# Patient Record
Sex: MALE | Race: WHITE | Employment: STUDENT | URBAN - NONMETROPOLITAN AREA
[De-identification: names, ages, dates, MRNs, and addresses within clinical notes are randomized per-mention and may not be internally consistent; named-entity substitution may affect disease eponyms.]

---

## 2017-06-27 ENCOUNTER — PROCEDURE VISIT (OUTPATIENT)
Dept: PRIMARY CARE CLINIC | Age: 22
End: 2017-06-27
Payer: MEDICAID

## 2017-06-27 DIAGNOSIS — Z11.1 TUBERCULOSIS SCREENING: Primary | ICD-10-CM

## 2017-06-27 PROCEDURE — 86580 TB INTRADERMAL TEST: CPT | Performed by: PEDIATRICS

## 2017-06-29 ENCOUNTER — OFFICE VISIT (OUTPATIENT)
Dept: PRIMARY CARE CLINIC | Age: 22
End: 2017-06-29
Payer: MEDICAID

## 2017-06-29 VITALS
OXYGEN SATURATION: 98 % | BODY MASS INDEX: 30.52 KG/M2 | SYSTOLIC BLOOD PRESSURE: 136 MMHG | HEART RATE: 101 BPM | DIASTOLIC BLOOD PRESSURE: 84 MMHG | WEIGHT: 218 LBS | HEIGHT: 71 IN | TEMPERATURE: 98.6 F

## 2017-06-29 DIAGNOSIS — Z00.00 WELL ADULT EXAM: Primary | ICD-10-CM

## 2017-06-29 PROCEDURE — 99395 PREV VISIT EST AGE 18-39: CPT | Performed by: NURSE PRACTITIONER

## 2017-06-29 ASSESSMENT — ENCOUNTER SYMPTOMS
EYE REDNESS: 0
DIARRHEA: 0
RHINORRHEA: 0
WHEEZING: 0
SORE THROAT: 0
COUGH: 0
EYE DISCHARGE: 0
CONSTIPATION: 0
CHOKING: 0
BLOOD IN STOOL: 0

## 2017-08-28 ENCOUNTER — OFFICE VISIT (OUTPATIENT)
Dept: PRIMARY CARE CLINIC | Age: 22
End: 2017-08-28
Payer: MEDICAID

## 2017-08-28 VITALS
HEART RATE: 76 BPM | SYSTOLIC BLOOD PRESSURE: 138 MMHG | OXYGEN SATURATION: 98 % | WEIGHT: 214.5 LBS | TEMPERATURE: 98.6 F | DIASTOLIC BLOOD PRESSURE: 76 MMHG | BODY MASS INDEX: 30.03 KG/M2 | HEIGHT: 71 IN

## 2017-08-28 DIAGNOSIS — M54.42 ACUTE LEFT-SIDED LOW BACK PAIN WITH LEFT-SIDED SCIATICA: Primary | ICD-10-CM

## 2017-08-28 PROCEDURE — 99213 OFFICE O/P EST LOW 20 MIN: CPT | Performed by: NURSE PRACTITIONER

## 2017-08-28 RX ORDER — PREDNISONE 10 MG/1
10 TABLET ORAL DAILY
Qty: 42 TABLET | Refills: 0 | Status: SHIPPED | OUTPATIENT
Start: 2017-08-28 | End: 2017-09-07

## 2017-08-28 RX ORDER — TESTOSTERONE CYPIONATE 200 MG/ML
10 INJECTION INTRAMUSCULAR ONCE
Status: COMPLETED | OUTPATIENT
Start: 2017-08-28 | End: 2017-08-28

## 2017-08-28 RX ADMIN — TESTOSTERONE CYPIONATE 10 MG: 200 INJECTION INTRAMUSCULAR at 16:51

## 2017-08-28 ASSESSMENT — ENCOUNTER SYMPTOMS
ABDOMINAL PAIN: 0
SHORTNESS OF BREATH: 0
SORE THROAT: 0
COUGH: 0
TROUBLE SWALLOWING: 0
BACK PAIN: 1
NAUSEA: 0
CONSTIPATION: 0
VOMITING: 0
RHINORRHEA: 0
DIARRHEA: 0

## 2017-09-01 ENCOUNTER — TELEPHONE (OUTPATIENT)
Dept: PRIMARY CARE CLINIC | Age: 22
End: 2017-09-01

## 2017-09-01 RX ORDER — GABAPENTIN 100 MG/1
100 CAPSULE ORAL 3 TIMES DAILY
Qty: 30 CAPSULE | Refills: 0 | OUTPATIENT
Start: 2017-09-01 | End: 2017-09-12

## 2017-09-12 ENCOUNTER — OFFICE VISIT (OUTPATIENT)
Dept: PRIMARY CARE CLINIC | Age: 22
End: 2017-09-12
Payer: MEDICAID

## 2017-09-12 VITALS
WEIGHT: 209.75 LBS | BODY MASS INDEX: 29.36 KG/M2 | HEIGHT: 71 IN | OXYGEN SATURATION: 98 % | SYSTOLIC BLOOD PRESSURE: 124 MMHG | DIASTOLIC BLOOD PRESSURE: 78 MMHG | TEMPERATURE: 96.4 F | HEART RATE: 73 BPM

## 2017-09-12 DIAGNOSIS — M62.838 MUSCLE SPASM: ICD-10-CM

## 2017-09-12 DIAGNOSIS — M54.32 SCIATICA OF LEFT SIDE: Primary | ICD-10-CM

## 2017-09-12 PROCEDURE — 99213 OFFICE O/P EST LOW 20 MIN: CPT | Performed by: PEDIATRICS

## 2017-09-12 RX ORDER — TIZANIDINE 4 MG/1
4 TABLET ORAL EVERY 6 HOURS PRN
Qty: 43 TABLET | Refills: 0 | Status: SHIPPED | OUTPATIENT
Start: 2017-09-12 | End: 2018-01-09

## 2017-09-12 RX ORDER — PREDNISONE 10 MG/1
TABLET ORAL
Qty: 43 TABLET | Refills: 0 | Status: SHIPPED | OUTPATIENT
Start: 2017-09-12 | End: 2017-09-25

## 2017-09-12 ASSESSMENT — ENCOUNTER SYMPTOMS
BACK PAIN: 0
SHORTNESS OF BREATH: 0
NAUSEA: 0
CHEST TIGHTNESS: 0
COUGH: 0
VOMITING: 0
ABDOMINAL PAIN: 0
SORE THROAT: 0
DIARRHEA: 0
WHEEZING: 0

## 2017-09-25 ENCOUNTER — OFFICE VISIT (OUTPATIENT)
Dept: PRIMARY CARE CLINIC | Age: 22
End: 2017-09-25
Payer: MEDICAID

## 2017-09-25 VITALS
OXYGEN SATURATION: 98 % | WEIGHT: 215 LBS | HEART RATE: 82 BPM | BODY MASS INDEX: 30.1 KG/M2 | DIASTOLIC BLOOD PRESSURE: 82 MMHG | TEMPERATURE: 96.7 F | HEIGHT: 71 IN | SYSTOLIC BLOOD PRESSURE: 132 MMHG

## 2017-09-25 DIAGNOSIS — G89.29 CHRONIC RIGHT-SIDED LOW BACK PAIN WITH RIGHT-SIDED SCIATICA: ICD-10-CM

## 2017-09-25 DIAGNOSIS — M54.41 CHRONIC RIGHT-SIDED LOW BACK PAIN WITH RIGHT-SIDED SCIATICA: ICD-10-CM

## 2017-09-25 DIAGNOSIS — M54.16 LUMBAR RADICULOPATHY: ICD-10-CM

## 2017-09-25 DIAGNOSIS — M54.31 SCIATICA OF RIGHT SIDE: Primary | ICD-10-CM

## 2017-09-25 PROCEDURE — 99213 OFFICE O/P EST LOW 20 MIN: CPT | Performed by: PEDIATRICS

## 2017-09-25 RX ORDER — NABUMETONE 750 MG/1
750 TABLET, FILM COATED ORAL 2 TIMES DAILY
Qty: 60 TABLET | Refills: 3 | Status: SHIPPED | OUTPATIENT
Start: 2017-09-25 | End: 2018-01-09

## 2017-09-25 ASSESSMENT — ENCOUNTER SYMPTOMS
NAUSEA: 0
WHEEZING: 0
SHORTNESS OF BREATH: 0
BACK PAIN: 1
DIARRHEA: 0
VOMITING: 0
ABDOMINAL PAIN: 0
SORE THROAT: 0
COUGH: 0
CHEST TIGHTNESS: 0

## 2017-10-02 ENCOUNTER — TELEPHONE (OUTPATIENT)
Dept: PRIMARY CARE CLINIC | Age: 22
End: 2017-10-02

## 2017-10-02 DIAGNOSIS — M54.31 RIGHT SIDED SCIATICA: ICD-10-CM

## 2017-10-02 DIAGNOSIS — M54.16 LUMBAR RADICULOPATHY: ICD-10-CM

## 2017-10-02 DIAGNOSIS — M54.41 CHRONIC RIGHT-SIDED LOW BACK PAIN WITH RIGHT-SIDED SCIATICA: Primary | ICD-10-CM

## 2017-10-02 DIAGNOSIS — G89.29 CHRONIC RIGHT-SIDED LOW BACK PAIN WITH RIGHT-SIDED SCIATICA: Primary | ICD-10-CM

## 2017-10-25 ENCOUNTER — HOSPITAL ENCOUNTER (OUTPATIENT)
Dept: GENERAL RADIOLOGY | Age: 22
Discharge: HOME | End: 2017-10-25
Payer: MEDICAID

## 2017-10-25 ENCOUNTER — TELEPHONE (OUTPATIENT)
Dept: PRIMARY CARE CLINIC | Age: 22
End: 2017-10-25

## 2017-10-25 ENCOUNTER — OFFICE VISIT (OUTPATIENT)
Dept: PRIMARY CARE CLINIC | Age: 22
End: 2017-10-25
Payer: MEDICAID

## 2017-10-25 VITALS
TEMPERATURE: 98.5 F | BODY MASS INDEX: 30.45 KG/M2 | HEART RATE: 79 BPM | HEIGHT: 71 IN | WEIGHT: 217.5 LBS | DIASTOLIC BLOOD PRESSURE: 74 MMHG | SYSTOLIC BLOOD PRESSURE: 126 MMHG | OXYGEN SATURATION: 99 %

## 2017-10-25 DIAGNOSIS — G89.29 CHRONIC LEFT-SIDED LOW BACK PAIN WITH LEFT-SIDED SCIATICA: ICD-10-CM

## 2017-10-25 DIAGNOSIS — M54.42 CHRONIC LEFT-SIDED LOW BACK PAIN WITH LEFT-SIDED SCIATICA: ICD-10-CM

## 2017-10-25 DIAGNOSIS — G89.29 CHRONIC LEFT-SIDED LOW BACK PAIN WITH LEFT-SIDED SCIATICA: Primary | ICD-10-CM

## 2017-10-25 DIAGNOSIS — M54.32 LEFT SIDED SCIATICA: ICD-10-CM

## 2017-10-25 DIAGNOSIS — M54.42 CHRONIC LEFT-SIDED LOW BACK PAIN WITH LEFT-SIDED SCIATICA: Primary | ICD-10-CM

## 2017-10-25 PROCEDURE — 99214 OFFICE O/P EST MOD 30 MIN: CPT | Performed by: PEDIATRICS

## 2017-10-25 PROCEDURE — 72100 X-RAY EXAM L-S SPINE 2/3 VWS: CPT

## 2017-10-25 PROCEDURE — G8427 DOCREV CUR MEDS BY ELIG CLIN: HCPCS | Performed by: PEDIATRICS

## 2017-10-25 PROCEDURE — 1036F TOBACCO NON-USER: CPT | Performed by: PEDIATRICS

## 2017-10-25 PROCEDURE — G8417 CALC BMI ABV UP PARAM F/U: HCPCS | Performed by: PEDIATRICS

## 2017-10-25 PROCEDURE — G8484 FLU IMMUNIZE NO ADMIN: HCPCS | Performed by: PEDIATRICS

## 2017-10-25 PROCEDURE — 72202 X-RAY EXAM SI JOINTS 3/> VWS: CPT

## 2017-10-25 ASSESSMENT — ENCOUNTER SYMPTOMS
CHEST TIGHTNESS: 0
NAUSEA: 0
DIARRHEA: 0
BLOOD IN STOOL: 0
SINUS PRESSURE: 0
EYE ITCHING: 0
EYE PAIN: 0
EYE REDNESS: 0
WHEEZING: 0
TROUBLE SWALLOWING: 0
SORE THROAT: 0
SHORTNESS OF BREATH: 0
VOMITING: 0
COUGH: 0
BACK PAIN: 1
EYE DISCHARGE: 0
ABDOMINAL PAIN: 0
CONSTIPATION: 0

## 2017-10-25 NOTE — PROGRESS NOTES
[address] [facility],  [address]  Phone [phone]   Fax [phone]      OFFICE VISIT: 10/25/2017    Karlo Human- : 1995      HPI  Reason For Visit:  Keenan Lafleur is a 25 y.o. Health Maintenance: HIV screen       The patient presents today for a 1 month follow up on back pain. Pt reports back pain that radiates down into his left leg. Hurts more in his leg now than his back. Report numbness and tingling in the leg and foot. States that sneezing, coughing, laughing all hurts his leg. Pt unable to do MRI of back due to insurance denying this. This will now have to be appealed in order to get it approved. This was scheduled at [facility]    Evaluation of his back pain began on 2017   At that time he had had problems for 2-3 months prior. He had failed ibuprofen at that time. He was treated with steroids and follow up in 2 wks    On  he was evaluated again   Prednisone was relatively ineffective but did alter the distribution of the pain. It was no longer going down to his distal lower extremity . He was having some m. Spasm and was put on tizanidine    Because he did have some appreciated benefit from prednisone, he was given another course. On  follow up, he continued to have pain in L sciatic distribution. xrays were ordered but he had them done by his chiropractor, so he did not do them. Ordered for lumbar spine and si joint on L. He had continued on nsaid therapy and continued home exercises. 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 He continues to be in severe pain. He has had some paroxysmal weakness, but this is mostly normal.  Primary problem is pain. He notes worsening of symptoms with laughing, coughing, sneezing. He also has worsening of symptoms with prolonged standing, some with sitting a lot.   Turning to look behind him is painful  Picking up something hurts and the pain is in his back and rapidly jolts down his left leg. Better:  Brief with higher dose of prednisone  Possibly when he ices his leg. Icing back does not seem to help. height is 5' 11\" (1.803 m) and weight is 217 lb 8 oz (98.7 kg). His temporal temperature is 98.5 °F (36.9 °C). His blood pressure is 126/74 and his pulse is 79. His oxygen saturation is 99%. Body mass index is 30.34 kg/m². No results found for this or any previous visit. I have reviewed the following with the Mr. Garvey   Lab Review   No visits with results within 6 Month(s) from this visit. Latest known visit with results is:   No results found for any previous visit. Copies of these are in the chart. Current Outpatient Prescriptions   Medication Sig Dispense Refill    nabumetone (RELAFEN) 750 MG tablet Take 1 tablet by mouth 2 times daily Take with food or milk 60 tablet 3    tiZANidine (ZANAFLEX) 4 MG tablet Take 1 tablet by mouth every 6 hours as needed (muscle spasm) 43 tablet 0     No current facility-administered medications for this visit. Allergies: Cinnamon; Shellfish-derived products; and Tomato    Past Medical History:   Diagnosis Date    Allergic rhinitis        History reviewed. No pertinent surgical history. Social History   Substance Use Topics    Smoking status: Never Smoker    Smokeless tobacco: Never Used    Alcohol use No       Review of Systems   Constitutional: Negative for activity change, chills, fatigue and fever. HENT: Negative for congestion, ear pain, nosebleeds, sinus pressure, sore throat and trouble swallowing. Eyes: Negative for pain, discharge, redness and itching. Respiratory: Negative for cough, chest tightness, shortness of breath and wheezing. Cardiovascular: Negative for chest pain, palpitations and leg swelling.    Gastrointestinal: Negative for abdominal pain, blood in stool, constipation, diarrhea, nausea and (MIN 4 VIEWS)    G89.29 724.3 XR SACROILIAC JOINTS (MIN 3 VIEWS)     338.29    2. Left sided sciatica M54.32 724.3 XR LUMBAR SPINE (MIN 4 VIEWS)      XR SACROILIAC JOINTS (MIN 3 VIEWS)       PLAN      ICD-10-CM ICD-9-CM    1. Chronic left-sided low back pain with left-sided sciatica M54.42 724.2 XR LUMBAR SPINE (MIN 4 VIEWS)    G89.29 724.3 XR SACROILIAC JOINTS (MIN 3 VIEWS)     338.29    2. Left sided sciatica M54.32 724.3 XR LUMBAR SPINE (MIN 4 VIEWS)      XR SACROILIAC JOINTS (MIN 3 VIEWS)       Orders Placed This Encounter   Procedures    XR LUMBAR SPINE (MIN 4 VIEWS)    XR SACROILIAC JOINTS (MIN 3 VIEWS)        Return in about 1 month (around 11/25/2017). There are no Patient Instructions on file for this visit. Additional Instructions: As always, patient is advised to bring in medication bottles in order to correctly reconcile with our current list.    Nadja Reynoso received counseling on the following healthy behaviors: continue present regimen with medications and exercises    Patient given educational materials on discussed lumbar disc disease and potential emergencies. I also explained that if xrays are normal, we will likely need mri to determine etiology    Discussed use, benefit, and side effects of prescribed medications. Barriers to medication compliance addressed. All patient questions answered. Pt voiced understanding. If you need to reach us for an appointment or any other urgent   scheduling issue,   please press the (*) sign at the beginning of the phone tree prompt after   Dialing the normal office number.     Libertad Hanson DO 20

## 2017-11-01 ENCOUNTER — TELEPHONE (OUTPATIENT)
Dept: PRIMARY CARE CLINIC | Age: 22
End: 2017-11-01

## 2017-11-27 ENCOUNTER — OFFICE VISIT (OUTPATIENT)
Dept: PRIMARY CARE CLINIC | Age: 22
End: 2017-11-27
Payer: MEDICAID

## 2017-11-27 VITALS
SYSTOLIC BLOOD PRESSURE: 112 MMHG | DIASTOLIC BLOOD PRESSURE: 72 MMHG | BODY MASS INDEX: 29.71 KG/M2 | WEIGHT: 212.25 LBS | HEIGHT: 71 IN | TEMPERATURE: 98.2 F | OXYGEN SATURATION: 98 % | HEART RATE: 64 BPM

## 2017-11-27 DIAGNOSIS — M54.16 LUMBAR RADICULOPATHY: Primary | ICD-10-CM

## 2017-11-27 PROCEDURE — 1036F TOBACCO NON-USER: CPT | Performed by: PEDIATRICS

## 2017-11-27 PROCEDURE — 99213 OFFICE O/P EST LOW 20 MIN: CPT | Performed by: PEDIATRICS

## 2017-11-27 PROCEDURE — G8484 FLU IMMUNIZE NO ADMIN: HCPCS | Performed by: PEDIATRICS

## 2017-11-27 PROCEDURE — G8417 CALC BMI ABV UP PARAM F/U: HCPCS | Performed by: PEDIATRICS

## 2017-11-27 PROCEDURE — G8427 DOCREV CUR MEDS BY ELIG CLIN: HCPCS | Performed by: PEDIATRICS

## 2017-11-27 ASSESSMENT — ENCOUNTER SYMPTOMS
SINUS PRESSURE: 0
NAUSEA: 0
BACK PAIN: 1
TROUBLE SWALLOWING: 0
COUGH: 0
VOMITING: 0
SHORTNESS OF BREATH: 0
CHEST TIGHTNESS: 0
ABDOMINAL PAIN: 0
VOICE CHANGE: 0
SORE THROAT: 0
DIARRHEA: 0
CONSTIPATION: 0
ABDOMINAL DISTENTION: 0
WHEEZING: 0
CHOKING: 0

## 2017-11-27 NOTE — PROGRESS NOTES
[address] [facility],  [address]  Phone [phone]   Fax [phone]      OFFICE VISIT: 2017    Ely Tracy- : 1995      HPI  Reason For Visit:  Priscilla Ragland is a 25 y.o. The patient presents today for a 1 month follow up on his back pain. See office note from 10/25/2017. States that he finally received a letter stating that his MRI has been approved    Pt reports his back and leg pain is the same. Has the same pain. He Can't tell that the Nabumetone is helping at all. He has been doing exercises but no benefit. He is trying to take it easy at work   Nothing has changed. height is 5' 11\" (1.803 m) and weight is 212 lb 4 oz (96.3 kg). His temporal temperature is 98.2 °F (36.8 °C). His blood pressure is 112/72 and his pulse is 64. His oxygen saturation is 98%. Body mass index is 29.6 kg/m². Lab Review   No visits with results within 6 Month(s) from this visit. Latest known visit with results is:   No results found for any previous visit. Copies of these are in the chart. Current Outpatient Prescriptions   Medication Sig Dispense Refill    nabumetone (RELAFEN) 750 MG tablet Take 1 tablet by mouth 2 times daily Take with food or milk 60 tablet 3    tiZANidine (ZANAFLEX) 4 MG tablet Take 1 tablet by mouth every 6 hours as needed (muscle spasm) 43 tablet 0     No current facility-administered medications for this visit. Allergies: Cinnamon; Shellfish-derived products; and Tomato    Past Medical History:   Diagnosis Date    Allergic rhinitis        History reviewed. No pertinent surgical history. Social History   Substance Use Topics    Smoking status: Never Smoker    Smokeless tobacco: Never Used    Alcohol use No       Review of Systems   Constitutional: Negative for activity change, appetite change, chills, diaphoresis, fatigue, fever and unexpected weight change.    HENT: Negative for congestion, ear pain, postnasal drip, sinus pressure, sneezing, sore throat, tinnitus, trouble swallowing and voice change. Eyes: Negative for visual disturbance. Respiratory: Negative for cough, choking, chest tightness, shortness of breath and wheezing. Cardiovascular: Negative for chest pain, palpitations and leg swelling. Gastrointestinal: Negative for abdominal distention, abdominal pain, constipation, diarrhea, nausea and vomiting. Genitourinary: Negative for decreased urine volume, difficulty urinating, dysuria, frequency, hematuria and urgency. Musculoskeletal: Positive for back pain. Negative for arthralgias, gait problem, joint swelling, myalgias, neck pain and neck stiffness. Skin: Negative for rash and wound. Neurological: Negative for dizziness, seizures, weakness, light-headedness, numbness and headaches. Hematological: Does not bruise/bleed easily. Psychiatric/Behavioral: Negative for agitation, confusion, decreased concentration, dysphoric mood, self-injury and sleep disturbance. The patient is not nervous/anxious and is not hyperactive. Physical Exam   Constitutional: He is oriented to person, place, and time. He appears well-developed and well-nourished. HENT:   Head: Normocephalic and atraumatic. Eyes: No scleral icterus. Neck: Normal range of motion. Neck supple. No edema present. Cardiovascular: Normal rate, regular rhythm, normal heart sounds and intact distal pulses. No murmur heard. Pulmonary/Chest: Effort normal and breath sounds normal.   Abdominal: Soft. Normal appearance and bowel sounds are normal. He exhibits no distension. There is no hepatosplenomegaly. There is no tenderness. Musculoskeletal: Normal range of motion. He exhibits tenderness (in lower leg but not at si joint or at l4-l5 regon. ). He exhibits no edema. Lumbar back: He exhibits tenderness (over left SI joint). He exhibits normal range of motion and no bony tenderness.    Straight leg raise is negative bilaterally. Neurological: He is alert and oriented to person, place, and time. Skin: Skin is warm, dry and intact. No rash noted. Psychiatric: He has a normal mood and affect. His speech is normal and behavior is normal. Judgment and thought content normal.   Vitals reviewed. ASSESSMENT      ICD-10-CM ICD-9-CM    1. Lumbar radiculopathy M54.16 724.4 MRI lumbar spine without contrast       PLAN      ICD-10-CM ICD-9-CM    1. Lumbar radiculopathy M54.16 724.4 MRI lumbar spine without contrast       Orders Placed This Encounter   Procedures    MRI lumbar spine without contrast        No Follow-up on file. There are no Patient Instructions on file for this visit. Additional Instructions: As always, patient is advised to bring in medication bottles in order to correctly reconcile with our current list.    Sharron Guzman received counseling on the following healthy behaviors: get mri done in near future    Patient given educational materials on lumbar radiculopathy    Discussed use, benefit, and side effects of prescribed medications. Barriers to medication compliance addressed. All patient questions answered. Pt voiced understanding. If you need to reach us for an appointment or any other urgent   scheduling issue,   please press the (*) sign at the beginning of the phone tree prompt after   Dialing the normal office number.     Yael Valdes,

## 2017-12-05 ENCOUNTER — TELEPHONE (OUTPATIENT)
Dept: PRIMARY CARE CLINIC | Age: 22
End: 2017-12-05

## 2017-12-05 ENCOUNTER — HOSPITAL ENCOUNTER (OUTPATIENT)
Dept: MRI IMAGING | Age: 22
Discharge: HOME | End: 2017-12-05
Payer: MEDICAID

## 2017-12-05 DIAGNOSIS — M54.16 LUMBAR RADICULOPATHY: ICD-10-CM

## 2017-12-05 DIAGNOSIS — M51.27 PROTRUSION OF INTERVERTEBRAL DISC OF LUMBOSACRAL REGION: Primary | ICD-10-CM

## 2017-12-05 PROCEDURE — 72148 MRI LUMBAR SPINE W/O DYE: CPT

## 2017-12-06 ENCOUNTER — TELEPHONE (OUTPATIENT)
Dept: NEUROSURGERY | Age: 22
End: 2017-12-06

## 2017-12-06 NOTE — TELEPHONE ENCOUNTER
Neurosurgical pre apt questionnaire     Referring physician? ELEANOR    Who is completing questionnaire? PATIENT     Has the pt had any previous spinal/brain surgeries? NO    If yes, Where was the surgery preformed? What was the surgery? Who was the surgeon? When was this surgery? Why is the pt not following up with previous surgeon? Is this a second opinion? Was a MRI preformed? YES    If not, Is there any reason a MRI cannot be performed? Is there metal anywhere in the body? If yes,  Where was the MRI preformed? [facility]   What part of the body? LUMBAR   When was it preformed? 12/5/17      Note:If  was not the facility that performed the study,    the disc will need to be brought appoint. Physical Therapy? YES - HOME THERAPY   If yes, where was PT completed? What is the duration of therapy? Pain Management? NO   If yes, where was pain mgt therapy? Is the patient still under contract with pain mgt? Who is the pain mgt physician? Is the pt currently taking anything for pain control? 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 Has the patient missed work due to pain? If unemployed, how long? Are you on disability? Symptoms?         LOW BACK PAIN, LEFT LEG PAIN IS THE WORST, PULLING AND SHOCKING PAIN

## 2018-01-09 ENCOUNTER — PREP FOR PROCEDURE (OUTPATIENT)
Dept: NEUROSURGERY | Age: 23
End: 2018-01-09

## 2018-01-09 ENCOUNTER — OFFICE VISIT (OUTPATIENT)
Dept: NEUROSURGERY | Age: 23
End: 2018-01-09
Payer: MEDICAID

## 2018-01-09 VITALS
SYSTOLIC BLOOD PRESSURE: 118 MMHG | OXYGEN SATURATION: 96 % | HEIGHT: 71 IN | DIASTOLIC BLOOD PRESSURE: 73 MMHG | BODY MASS INDEX: 30.52 KG/M2 | HEART RATE: 72 BPM | WEIGHT: 218 LBS

## 2018-01-09 DIAGNOSIS — M79.605 LEFT LEG PAIN: ICD-10-CM

## 2018-01-09 DIAGNOSIS — M54.17 LUMBOSACRAL RADICULOPATHY AT L5: ICD-10-CM

## 2018-01-09 DIAGNOSIS — R20.0 LEFT LEG NUMBNESS: ICD-10-CM

## 2018-01-09 DIAGNOSIS — M51.16 LUMBAR DISC HERNIATION WITH RADICULOPATHY: Primary | ICD-10-CM

## 2018-01-09 PROCEDURE — 99215 OFFICE O/P EST HI 40 MIN: CPT | Performed by: NURSE PRACTITIONER

## 2018-01-09 PROCEDURE — G8427 DOCREV CUR MEDS BY ELIG CLIN: HCPCS | Performed by: NURSE PRACTITIONER

## 2018-01-09 PROCEDURE — 1036F TOBACCO NON-USER: CPT | Performed by: NURSE PRACTITIONER

## 2018-01-09 PROCEDURE — G8484 FLU IMMUNIZE NO ADMIN: HCPCS | Performed by: NURSE PRACTITIONER

## 2018-01-09 PROCEDURE — G8417 CALC BMI ABV UP PARAM F/U: HCPCS | Performed by: NURSE PRACTITIONER

## 2018-01-09 RX ORDER — SODIUM CHLORIDE 0.9 % (FLUSH) 0.9 %
10 SYRINGE (ML) INJECTION EVERY 12 HOURS SCHEDULED
Status: CANCELLED | OUTPATIENT
Start: 2018-01-09

## 2018-01-09 RX ORDER — SODIUM CHLORIDE 0.9 % (FLUSH) 0.9 %
10 SYRINGE (ML) INJECTION PRN
Status: CANCELLED | OUTPATIENT
Start: 2018-01-09

## 2018-01-09 ASSESSMENT — ENCOUNTER SYMPTOMS
RESPIRATORY NEGATIVE: 1
BACK PAIN: 1
GASTROINTESTINAL NEGATIVE: 1
EYES NEGATIVE: 1

## 2018-01-09 NOTE — PROGRESS NOTES
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 The pain does radiate into the left posterolateral thigh, lateral aspect of knee, lateral aspect of shin, and bottom of foot. His pain is mostly located in the left leg. The patient complains of numbness in the left foot. His pain is not changed when going from a seated to standing position. His pain is worsened with walking. His pain is improved when lying flat. Overall, indicative that the patient does have a mechanical nature to their pain. He states that 40% of his pain is located in the back and 60% is leg pain. He states that his pain is worse with standing in one position constantly. The patient states that he can no longer get comfortable which has dramatically affected his quality of life. The patient has underwent a non-operative treatment course that has included:  NSAIDs  Muscle Relaxers   Opiates  Physical Therapy at home  Chiropractic Manipulation    Of note he does not use tobacco and does not take blood thinning medications. Past Medical History:   Diagnosis Date    Allergic rhinitis        History reviewed. No pertinent surgical history. No current outpatient prescriptions on file. No current facility-administered medications for this visit. Allergies:  Cinnamon; Shellfish-derived products; and Tomato    Social History:   History   Smoking Status    Never Smoker   Smokeless Tobacco    Never Used     History   Alcohol Use No         Family History:   History reviewed. No pertinent family history.     REVIEW OF SYSTEMS:  Review of Systems LABALBU, BILITOT, ALKPHOS, AST, ALT, LABGLOM, GFRAA, AGRATIO, GLOBNo results found for: INR, PROTIME    Narrative   MRI LUMBAR SPINE WO CONTRAST 12/5/2017 10:38 AM   HISTORY: M54.16   COMPARISON: 10/25/2017 lumbar radiographs   TECHNIQUE:  Multiplanar MR imaging of the lumbar spine was performed. FINDINGS:    Normal marrow signal is identified in the lumbar spine. Particularly   there is no bone marrow edema or evidence of acute bony abnormality. There is no signal abnormalities identified in the distal cord or   conus medullaris region. 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 Bev Mckeon DISC SPACE LEVELS:   L5-S1: There is mild disc degeneration. Disc space narrowing   suspected. There is mild desiccation disc material loss of normal T2   signal. There is a broad-based right paracentral disc protrusion with   disc material extending into the right lateral recess, abutting the   right S1 nerve root without significant mass effect on the neural   structures otherwise. Correlate for right S1 radiculopathy. Disc   material does extend into the right foramina. There is no significant   canal stenosis. There is no significant foraminal narrowing. L4-L5: There is disc degeneration with desiccation of disc material   and mild loss of normal T2 signal. Mild thinning of the disc space   question. There is broad-based central to left paracentral disc   protrusion versus mild extrusion. There is significant associated mass   effect on the thecal sac with central left paracentral deformity and   relative canal stenosis. Mass effect on the left L5 nerve root   observed. Correlate for left L5 radiculopathy. The disc material   extending into the left foramina. No significant foraminal stenosis   identified. The remaining disc spaces are maintained without disc significant disc   bulges focal protrusions or herniations.  There is no significant canal

## 2018-01-10 NOTE — H&P
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 The pain does radiate into the left posterolateral thigh, lateral aspect of knee, lateral aspect of shin, and bottom of foot. His pain is mostly located in the left leg. The patient complains of numbness in the left foot. His pain is not changed when going from a seated to standing position. His pain is worsened with walking. His pain is improved when lying flat. Overall, indicative that the patient does have a mechanical nature to their pain. He states that 40% of his pain is located in the back and 60% is leg pain. He states that his pain is worse with standing in one position constantly. The patient states that he can no longer get comfortable which has dramatically affected his quality of life. The patient has underwent a non-operative treatment course that has included:  NSAIDs  Muscle Relaxers   Opiates  Physical Therapy at home  Chiropractic Manipulation    Of note he does not use tobacco and does not take blood thinning medications. Past Medical History:   Diagnosis Date    Allergic rhinitis        History reviewed. No pertinent surgical history. No current outpatient prescriptions on file. No current facility-administered medications for this visit. Allergies:  Cinnamon; Shellfish-derived products; and Tomato    Social History:   History   Smoking Status    Never Smoker   Smokeless Tobacco    Never Used     History   Alcohol Use No         Family History:   History reviewed. No pertinent family history.     REVIEW OF SYSTEMS:  Review of Systems   Constitutional: Positive for malaise/fatigue. Negative for chills, diaphoresis, fever and weight loss. HENT: Negative. Eyes: Negative. Respiratory: Negative. Cardiovascular: Negative. Gastrointestinal: Negative. Genitourinary: Negative. Musculoskeletal: Positive for back pain, joint pain and myalgias. Negative for falls and neck pain. Skin: Negative. Neurological: Positive for tingling and weakness. Negative for dizziness, tremors, sensory change, speech change, focal weakness, seizures, loss of consciousness and headaches. Endo/Heme/Allergies: Negative. Psychiatric/Behavioral: Negative. PHYSICAL EXAM:  Vitals:    01/09/18 1143   BP: 118/73   Pulse: 72   SpO2: 96%     Constitutional: appears well-developed and well-nourished. Eyes - conjunctiva normal.  Pupils react to light  Ear, nose, throat -hearing intact to finger rub, No scars, masses, or lesions over external nose or ears, no atrophy of tongue  Neck-symmetric, no masses noted, no jugular vein distension  Respiration- chest wall appears symmetric, good expansion, normal effort without use of accessory muscles  Musculoskeletal - no significant wasting of muscles noted, no bony deformities, gait no gross ataxia  Extremities-no clubbing, cyanosis or edema  Skin - warm, dry, and intact. No rash, erythema, or pallor. Psychiatric - mood, affect, and behavior appear normal.     Neurologic Examination  Awake, Alert and oriented x 3  Normal speech pattern, following commands  Motor 5/5 all extremities  No deficits to light touch or pinprick sensation  Reflexes are 2+ and symmetric  No myofacial tenderness to palpation  Normal Gait pattern    Straight leg raise was positive on the left    DATA and IMAGING:    Nursing/pcp notes, imaging, labs, and vitals reviewed.      PT,OT and/or speech notes reviewed    No results found for: WBC, HGB, HCT, MCV, PLTNo results found for: NA, K, CL, CO2, BUN, CREATININE, GLUCOSE, CALCIUM, PROT, LABALBU, BILITOT, Impression   1. Broad-based central left paracentral disc protrusion/extrusion at   L4-L5 with associated mass effect on the neural structures, described   above. 2. Right paracentral disc protrusion L5-S1. Signed by Dr David Rodrigues on 12/5/2017 12:41 PM     I have personally reviewed these images and my interpretation is:  L4-5 there is a left posterolateral disc herniation that results in compression of the left L5 nerve root  L5-S1 there is a right posterolateral disc herniation that results in compression of the right S1 nerve root       ASSESSMENT:    Martha Aaron is a 25 y.o. male with complaints of left leg pain that has been present for about 6 months. He describes a L5 radicular pattern. ICD-10-CM ICD-9-CM    1. Lumbar disc herniation with radiculopathy M51.16 722.10    2. Lumbosacral radiculopathy at L5 M54.17 724.4    3. Left leg pain M79.605 729.5    4. Left leg numbness R20.0 782.0        PLAN:  -We discussed the results/findings of the MRI lumbar spine with Mr. Sindy Myers and his wife at length. We explained that he does have a herniated disc that correlates well with his described symptoms. We also explained that this is a benign process and that his symptoms will be amenable to surgery, however, we are giving him the option whether or not to proceed with surgery or continue non-operative treatments.    -He and his wife have decided to move forward with surgery. He will need a Left L4-5 microdiscectomy using minimally invasive technique     We discussed risks, complications, and expectations, including but not limited to infection, paralysis, bowel and bladder dysfunction, possible need for revision procedure, persistent pain, spinal fluid leak, stroke and death. In addition, the benefits of the surgery were thoroughly discussed and the patient demonstrated a deep understanding. The patient wishes to proceed with surgical intervention.   We will schedule for surgery in the near

## 2018-01-15 ENCOUNTER — TELEPHONE (OUTPATIENT)
Dept: NEUROSURGERY | Age: 23
End: 2018-01-15

## 2018-02-08 ENCOUNTER — TELEPHONE (OUTPATIENT)
Dept: NEUROSURGERY | Age: 23
End: 2018-02-08

## 2018-03-06 ENCOUNTER — OFFICE VISIT (OUTPATIENT)
Dept: PRIMARY CARE CLINIC | Age: 23
End: 2018-03-06
Payer: MEDICAID

## 2018-03-06 VITALS
BODY MASS INDEX: 28.84 KG/M2 | DIASTOLIC BLOOD PRESSURE: 84 MMHG | TEMPERATURE: 98.3 F | SYSTOLIC BLOOD PRESSURE: 136 MMHG | HEIGHT: 71 IN | WEIGHT: 206 LBS | OXYGEN SATURATION: 99 % | HEART RATE: 67 BPM

## 2018-03-06 DIAGNOSIS — R39.89 URINE DISCOLORATION: Primary | ICD-10-CM

## 2018-03-06 DIAGNOSIS — R39.89 URINE DISCOLORATION: ICD-10-CM

## 2018-03-06 LAB
APPEARANCE FLUID: NORMAL
BILIRUBIN, POC: NORMAL
BLOOD URINE, POC: NORMAL
CLARITY, POC: CLEAR
COLOR, POC: YELLOW
GLUCOSE URINE, POC: NORMAL
KETONES, POC: NORMAL
LEUKOCYTE EST, POC: NORMAL
NITRITE, POC: NORMAL
PH, POC: 6
PROTEIN, POC: NORMAL
SPECIFIC GRAVITY, POC: 1.02
UROBILINOGEN, POC: 0.2

## 2018-03-06 PROCEDURE — 1036F TOBACCO NON-USER: CPT | Performed by: NURSE PRACTITIONER

## 2018-03-06 PROCEDURE — G8427 DOCREV CUR MEDS BY ELIG CLIN: HCPCS | Performed by: NURSE PRACTITIONER

## 2018-03-06 PROCEDURE — 99213 OFFICE O/P EST LOW 20 MIN: CPT | Performed by: NURSE PRACTITIONER

## 2018-03-06 PROCEDURE — G8484 FLU IMMUNIZE NO ADMIN: HCPCS | Performed by: NURSE PRACTITIONER

## 2018-03-06 PROCEDURE — G8417 CALC BMI ABV UP PARAM F/U: HCPCS | Performed by: NURSE PRACTITIONER

## 2018-03-06 ASSESSMENT — ENCOUNTER SYMPTOMS
WHEEZING: 0
COUGH: 0
EYE DISCHARGE: 0
CONSTIPATION: 0
SORE THROAT: 0
DIARRHEA: 0
EYE REDNESS: 0
CHOKING: 0
BLOOD IN STOOL: 0
RHINORRHEA: 0

## 2018-03-06 NOTE — PROGRESS NOTES
Sheryl [address]  [place], [address] [place] [address] Rd  Phone [phone]   Fax [phone]      OFFICE VISIT: 3/6/2018    Lisa Schmidt is a 25 y.o. male who presents today for his medical conditions/complaints as noted below. Lisa Schmidt is c/o of Other (first urine yesterday was white. has been normal since then. )        HPI:     HPI  Other (first urine yesterday was white. has been normal since then. )  No other symptoms  Denies dysuria, discharge, testicle pain and swelling. Normal urine since. Past Medical History:   Diagnosis Date    Allergic rhinitis       No past surgical history on file. No family history on file. Social History   Substance Use Topics    Smoking status: Never Smoker    Smokeless tobacco: Never Used    Alcohol use No      No current outpatient prescriptions on file. No current facility-administered medications for this visit. Allergies   Allergen Reactions    Cinnamon     Shellfish-Derived Products     Tomato        Health Maintenance   Topic Date Due    HIV screen  06/26/2011    Meningococcal (MCV) Vaccine Age 0-22 Years (1 of 1) 06/26/2012    DTaP/Tdap/Td vaccine (1 - Tdap) 06/26/2015    Flu vaccine (1) 09/01/2017        Subjective:      Review of Systems   Constitutional: Negative for appetite change and unexpected weight change. HENT: Negative for congestion, ear pain, rhinorrhea and sore throat. Eyes: Negative for discharge and redness. Respiratory: Negative for cough, choking and wheezing. Cardiovascular: Negative for chest pain. Gastrointestinal: Negative for blood in stool, constipation and diarrhea. Genitourinary: Negative for decreased urine volume, difficulty urinating, discharge, flank pain, frequency, genital sores, hematuria, penile pain, penile swelling, scrotal swelling, testicular pain and urgency. Skin: Negative for rash. Neurological: Negative for weakness. Hematological: Negative for adenopathy.    Psychiatric/Behavioral: Negative for suicidal ideas. Objective:     Physical Exam   Constitutional: He is oriented to person, place, and time. He appears well-developed and well-nourished. HENT:   Head: Normocephalic. Eyes: Conjunctivae are normal.   Neck: Normal range of motion. Neck supple. Cardiovascular: Normal rate and regular rhythm. Pulmonary/Chest: Effort normal.   Musculoskeletal: He exhibits no edema. Neurological: He is alert and oriented to person, place, and time. Skin: Skin is warm and dry. Psychiatric: His behavior is normal.   Vitals reviewed. /84   Pulse 67   Temp 98.3 °F (36.8 °C) (Temporal)   Ht 5' 11\" (1.803 m)   Wt 206 lb (93.4 kg)   SpO2 99%   BMI 28.73 kg/m²     Assessment:        ICD-10-CM ICD-9-CM    1. Urine discoloration R39.89 791.9 POCT Urinalysis no Micro      Urine Culture      C. Trachomatis / N. Gonorrhoeae, DNA       Plan:    will check culture  If further symptoms will refer to urology. Return if symptoms worsen or fail to improve. Orders Placed This Encounter   Procedures    Urine Culture     Standing Status:   Future     Standing Expiration Date:   3/6/2019     Order Specific Question:   Specify (ex-cath, midstream, cysto, etc)? Answer:   midstream    C. Trachomatis / N. Gonorrhoeae, DNA    POCT Urinalysis no Micro     No orders of the defined types were placed in this encounter. Discussed use, benefit, and side effects of prescribed medications. All patient questions answered. Pt voiced understanding. Reviewed health maintenance. .  Patient agreed with treatment plan. Follow up as directed. There are no Patient Instructions on file for this visit.       Electronically signed by ARTURO Rodriguez on 3/6/2018 at 4:16 PM

## 2018-03-08 LAB — URINE CULTURE, ROUTINE: NORMAL

## 2018-03-09 ENCOUNTER — TELEPHONE (OUTPATIENT)
Dept: PRIMARY CARE CLINIC | Age: 23
End: 2018-03-09

## 2018-03-09 LAB
APTIMA MEDIA TYPE: NORMAL
CHLAMYDIA TRACHOMATIS AMPLIFIED DET: NEGATIVE
N GONORRHOEAE AMPLIFIED DET: NEGATIVE
SPECIMEN SOURCE: NORMAL

## 2018-03-12 ENCOUNTER — TELEPHONE (OUTPATIENT)
Dept: PRIMARY CARE CLINIC | Age: 23
End: 2018-03-12

## 2018-03-12 NOTE — TELEPHONE ENCOUNTER
----- Message from ARTURO Cox sent at 3/12/2018  8:27 AM CDT -----  Please inform patient results are normal urine testing

## 2018-03-16 ENCOUNTER — OFFICE VISIT (OUTPATIENT)
Dept: PRIMARY CARE CLINIC | Age: 23
End: 2018-03-16
Payer: MEDICAID

## 2018-03-16 VITALS
HEART RATE: 66 BPM | WEIGHT: 204 LBS | DIASTOLIC BLOOD PRESSURE: 70 MMHG | OXYGEN SATURATION: 99 % | BODY MASS INDEX: 28.56 KG/M2 | HEIGHT: 71 IN | TEMPERATURE: 98.7 F | SYSTOLIC BLOOD PRESSURE: 110 MMHG

## 2018-03-16 DIAGNOSIS — R50.9 FEVER, UNSPECIFIED FEVER CAUSE: ICD-10-CM

## 2018-03-16 DIAGNOSIS — J02.0 PHARYNGITIS DUE TO STREPTOCOCCUS SPECIES: Primary | ICD-10-CM

## 2018-03-16 DIAGNOSIS — J02.0 STREP PHARYNGITIS: ICD-10-CM

## 2018-03-16 LAB — S PYO AG THROAT QL: POSITIVE

## 2018-03-16 PROCEDURE — 1036F TOBACCO NON-USER: CPT | Performed by: NURSE PRACTITIONER

## 2018-03-16 PROCEDURE — 99213 OFFICE O/P EST LOW 20 MIN: CPT | Performed by: NURSE PRACTITIONER

## 2018-03-16 PROCEDURE — G8417 CALC BMI ABV UP PARAM F/U: HCPCS | Performed by: NURSE PRACTITIONER

## 2018-03-16 PROCEDURE — 87880 STREP A ASSAY W/OPTIC: CPT | Performed by: NURSE PRACTITIONER

## 2018-03-16 PROCEDURE — G8427 DOCREV CUR MEDS BY ELIG CLIN: HCPCS | Performed by: NURSE PRACTITIONER

## 2018-03-16 PROCEDURE — G8484 FLU IMMUNIZE NO ADMIN: HCPCS | Performed by: NURSE PRACTITIONER

## 2018-03-16 RX ORDER — AMOXICILLIN 875 MG/1
875 TABLET, COATED ORAL 2 TIMES DAILY
Qty: 20 TABLET | Refills: 0 | Status: SHIPPED | OUTPATIENT
Start: 2018-03-16 | End: 2018-03-26

## 2018-03-16 ASSESSMENT — ENCOUNTER SYMPTOMS
RHINORRHEA: 0
SORE THROAT: 1
NAUSEA: 0
CONSTIPATION: 0
EYE REDNESS: 0
DIARRHEA: 0
EYE DISCHARGE: 0
SWOLLEN GLANDS: 1
BLOOD IN STOOL: 0
CHOKING: 0
COUGH: 0
WHEEZING: 0

## 2018-03-16 NOTE — PROGRESS NOTES
rash.   Neurological: Positive for headaches. Negative for weakness. Hematological: Negative for adenopathy. Psychiatric/Behavioral: Negative for suicidal ideas. Objective:     Physical Exam   Constitutional: He is oriented to person, place, and time. He appears well-developed and well-nourished. HENT:   Head: Normocephalic. Mouth/Throat: Oropharyngeal exudate, posterior oropharyngeal edema and posterior oropharyngeal erythema present. Eyes: Conjunctivae are normal.   Neck: Normal range of motion. Neck supple. Cardiovascular: Normal rate, regular rhythm and normal heart sounds. Pulmonary/Chest: Effort normal and breath sounds normal.   Abdominal: Soft. Bowel sounds are normal. There is no tenderness. Musculoskeletal: He exhibits no edema. Neurological: He is alert and oriented to person, place, and time. Skin: Skin is warm and dry. Psychiatric: His behavior is normal.   Vitals reviewed. /70 (Site: Left Arm, Position: Sitting, Cuff Size: Large Adult)   Pulse 66   Temp 98.7 °F (37.1 °C) (Temporal)   Ht 5' 11\" (1.803 m)   Wt 204 lb (92.5 kg)   SpO2 99%   BMI 28.45 kg/m²     Assessment:        ICD-10-CM ICD-9-CM    1. Pharyngitis due to Streptococcus species J02.0 034.0 POCT rapid strep A   2. Fever, unspecified fever cause R50.9 780.60    3. Strep pharyngitis J02.0 034.0 amoxicillin (AMOXIL) 875 MG tablet       Plan:    strep pos    Return if symptoms worsen or fail to improve. Orders Placed This Encounter   Procedures    POCT rapid strep A     Orders Placed This Encounter   Medications    amoxicillin (AMOXIL) 875 MG tablet     Sig: Take 1 tablet by mouth 2 times daily for 10 days     Dispense:  20 tablet     Refill:  0         Discussed use, benefit, and side effects of prescribed medications. All patient questions answered. Pt voiced understanding. Reviewed health maintenance. .  Patient agreed with treatment plan. Follow up as directed.             There are no

## 2018-03-19 ENCOUNTER — TELEPHONE (OUTPATIENT)
Dept: PRIMARY CARE CLINIC | Age: 23
End: 2018-03-19

## 2018-03-20 ENCOUNTER — OFFICE VISIT (OUTPATIENT)
Dept: PRIMARY CARE CLINIC | Age: 23
End: 2018-03-20
Payer: MEDICAID

## 2018-03-20 VITALS
BODY MASS INDEX: 28.7 KG/M2 | HEIGHT: 71 IN | SYSTOLIC BLOOD PRESSURE: 106 MMHG | DIASTOLIC BLOOD PRESSURE: 74 MMHG | HEART RATE: 69 BPM | WEIGHT: 205 LBS | OXYGEN SATURATION: 99 % | TEMPERATURE: 97.9 F

## 2018-03-20 DIAGNOSIS — R10.31 RIGHT INGUINAL PAIN: Primary | ICD-10-CM

## 2018-03-20 PROCEDURE — G8484 FLU IMMUNIZE NO ADMIN: HCPCS | Performed by: PEDIATRICS

## 2018-03-20 PROCEDURE — G8427 DOCREV CUR MEDS BY ELIG CLIN: HCPCS | Performed by: PEDIATRICS

## 2018-03-20 PROCEDURE — G8417 CALC BMI ABV UP PARAM F/U: HCPCS | Performed by: PEDIATRICS

## 2018-03-20 PROCEDURE — 1036F TOBACCO NON-USER: CPT | Performed by: PEDIATRICS

## 2018-03-20 PROCEDURE — 99213 OFFICE O/P EST LOW 20 MIN: CPT | Performed by: PEDIATRICS

## 2018-03-20 RX ORDER — CEPHALEXIN 500 MG/1
500 CAPSULE ORAL 3 TIMES DAILY
Qty: 30 CAPSULE | Refills: 0 | Status: SHIPPED | OUTPATIENT
Start: 2018-03-20 | End: 2018-07-19

## 2018-03-20 ASSESSMENT — ENCOUNTER SYMPTOMS
WHEEZING: 0
DIARRHEA: 0
ABDOMINAL DISTENTION: 0
ABDOMINAL PAIN: 0
SHORTNESS OF BREATH: 0
NAUSEA: 0
SINUS PRESSURE: 0
CHEST TIGHTNESS: 0
VOICE CHANGE: 0
CONSTIPATION: 0
COUGH: 0
CHOKING: 0
BACK PAIN: 1
VOMITING: 0
SORE THROAT: 0
TROUBLE SWALLOWING: 0

## 2018-03-20 NOTE — PROGRESS NOTES
bruise/bleed easily. Psychiatric/Behavioral: Negative for agitation, confusion, decreased concentration, dysphoric mood, self-injury and sleep disturbance. The patient is not nervous/anxious and is not hyperactive. Physical Exam   Constitutional: He is oriented to person, place, and time. He appears well-developed and well-nourished. No distress. HENT:   Head: Normocephalic and atraumatic. Right Ear: External ear normal.   Left Ear: External ear normal.   Nose: Nose normal.   Mouth/Throat: Oropharynx is clear and moist.   Eyes: Conjunctivae and EOM are normal. Pupils are equal, round, and reactive to light. No scleral icterus. Neck: Normal range of motion. Neck supple. No JVD present. Carotid bruit is not present. No thyromegaly present. Cardiovascular: Normal rate, regular rhythm, S1 normal, S2 normal and normal heart sounds. No extrasystoles are present. PMI is not displaced. Exam reveals no gallop and no friction rub. No murmur heard. Pulmonary/Chest: Effort normal and breath sounds normal. No respiratory distress. He has no wheezes. He has no rhonchi. He has no rales. Abdominal: Soft. Bowel sounds are normal. There is no hepatosplenomegaly. There is no tenderness. There is no rebound, no guarding and no CVA tenderness. Genitourinary: Penis normal. No penile tenderness. Genitourinary Comments: Testicular exam is unremarkable without any pain over the epididymis or testicle. No masses present. No evidence of any inguinal hernias. There are no cutaneous changes noted   Musculoskeletal: Normal range of motion. He exhibits no edema or tenderness. Lymphadenopathy:     He has no cervical adenopathy. Neurological: He is alert and oriented to person, place, and time. He has normal strength. No sensory deficit (no numbness or tingling). Skin: Skin is warm and dry. No rash noted. Psychiatric: He has a normal mood and affect. ASSESSMENT      ICD-10-CM ICD-9-CM    1.  Right inguinal pain R10.31 789.09        PLAN      ICD-10-CM ICD-9-CM    1. Right inguinal pain R10.31 789.09 I see no gross abnormality on exam.  In laboratory review he did have some microscopic hematuria when his symptoms began. This may be representing renal lithiasis and a traveling stone. Symptoms have predominantly improved and I reluctant to order a CT scan at this time given that he may have already passed the stone. I recommend patient for him at this point is to increase his fluid consumption to facilitate passage of a stone. His symptoms do not improve he will call me back within the next few days. No orders of the defined types were placed in this encounter. Return if symptoms worsen or fail to improve. There are no Patient Instructions on file for this visit.

## 2018-07-19 ENCOUNTER — OFFICE VISIT (OUTPATIENT)
Dept: PRIMARY CARE CLINIC | Age: 23
End: 2018-07-19
Payer: MEDICAID

## 2018-07-19 VITALS
DIASTOLIC BLOOD PRESSURE: 74 MMHG | SYSTOLIC BLOOD PRESSURE: 124 MMHG | TEMPERATURE: 99 F | WEIGHT: 198.8 LBS | HEIGHT: 71 IN | BODY MASS INDEX: 27.83 KG/M2 | HEART RATE: 78 BPM | OXYGEN SATURATION: 98 %

## 2018-07-19 DIAGNOSIS — K92.1 HEMATOCHEZIA: ICD-10-CM

## 2018-07-19 DIAGNOSIS — R19.7 DIARRHEA OF PRESUMED INFECTIOUS ORIGIN: ICD-10-CM

## 2018-07-19 LAB
C DIFFICILE TOXIN, EIA: NORMAL
CRYPTOSPORIDIUM ANTIGEN STOOL: NORMAL
GIARDIA ANTIGEN STOOL: NORMAL
LACTOFERRIN, FECAL: NEGATIVE

## 2018-07-19 PROCEDURE — 1036F TOBACCO NON-USER: CPT | Performed by: PEDIATRICS

## 2018-07-19 PROCEDURE — G8427 DOCREV CUR MEDS BY ELIG CLIN: HCPCS | Performed by: PEDIATRICS

## 2018-07-19 PROCEDURE — G8417 CALC BMI ABV UP PARAM F/U: HCPCS | Performed by: PEDIATRICS

## 2018-07-19 PROCEDURE — 99213 OFFICE O/P EST LOW 20 MIN: CPT | Performed by: PEDIATRICS

## 2018-07-19 RX ORDER — LACTOBACILLUS RHAMNOSUS GG 10B CELL
1 CAPSULE ORAL DAILY
COMMUNITY
End: 2019-11-21

## 2018-07-19 ASSESSMENT — ENCOUNTER SYMPTOMS
NAUSEA: 0
CHEST TIGHTNESS: 0
VOMITING: 0
ABDOMINAL PAIN: 1
DIARRHEA: 1
SHORTNESS OF BREATH: 0
CONSTIPATION: 0
CHOKING: 0
SORE THROAT: 0
SINUS PRESSURE: 0
TROUBLE SWALLOWING: 0
WHEEZING: 0
BACK PAIN: 0
ABDOMINAL DISTENTION: 0
COUGH: 0
VOICE CHANGE: 0

## 2018-07-19 NOTE — PROGRESS NOTES
[address] [facility], [address]  Phone [phone]   Fax [phone]      OFFICE VISIT: 2018    Guadalupe Lo- : 1995      HPI  Reason For Visit:  Angela Andre is a 26 y.o. Health Maintenance    Abdominal Pain (Patient reports abd pain x's 4 days) and Diarrhea (Patient states he has had diarrhea x's 4 days as well )    Patient presents with abdominal pain and diarrhea  Is been going on for the past 4 days. No other fever or chills  No real cramping  No uri symptosm  He notes a faint amount of blood when he wipes. No unusual foods. Ill contacts: none that he knows of    Treatment:  Pepto Bismol  Keiffer treatment   Increased fiber     height is 5' 11\" (1.803 m) and weight is 198 lb 12.8 oz (90.2 kg). His temporal temperature is 99 °F (37.2 °C). His blood pressure is 124/74 and his pulse is 78. His oxygen saturation is 98%. Body mass index is 27.73 kg/m². I have reviewed the following with the Mr. Garvey   Lab Review   Office Visit on 2018   Component Date Value    Strep A Ag 2018 Positive*   Orders Only on 2018   Component Date Value    Urine Culture, Routine 2018 No growth at 36-48 hours     C. Trachomatis Amplified 2018 Negative     N. Gonorrhoeae Amplified 2018 Negative     APTIMA Media Type 2018 Urine     Specimen Source 2018 Urine    Office Visit on 2018   Component Date Value    Color, UA 2018 yellow     Clarity, UA 2018 clear     Glucose, UA POC 2018 neg     Bilirubin, UA 2018 neg     Ketones, UA 2018 neg     Spec Grav, UA 2018 1.020     Blood, UA POC 2018 trace-intact     pH, UA 2018 6.0     Protein, UA POC 2018 neg     Urobilinogen, UA 2018 0.2     Leukocytes, UA 2018 neg     Nitrite, UA 2018 neg      Copies of these are in the chart.     Current Outpatient Prescriptions   Medication Sig Dispense Refill    self-injury and sleep disturbance. The patient is not nervous/anxious and is not hyperactive. Physical Exam   Constitutional: He is oriented to person, place, and time. He appears well-developed and well-nourished. No distress. HENT:   Head: Normocephalic and atraumatic. Right Ear: External ear normal.   Left Ear: External ear normal.   Nose: Nose normal.   Mouth/Throat: Oropharynx is clear and moist.   Eyes: Conjunctivae and EOM are normal. Pupils are equal, round, and reactive to light. No scleral icterus. Neck: Normal range of motion. Neck supple. No JVD present. Carotid bruit is not present. No thyromegaly present. Cardiovascular: Normal rate, regular rhythm, S1 normal, S2 normal and normal heart sounds. No extrasystoles are present. PMI is not displaced. Exam reveals no gallop and no friction rub. No murmur heard. Pulmonary/Chest: Effort normal and breath sounds normal. No respiratory distress. He has no wheezes. He has no rhonchi. He has no rales. Abdominal: Soft. Bowel sounds are normal. He exhibits no distension. There is no hepatosplenomegaly. There is no tenderness. There is no rebound, no guarding and no CVA tenderness. Genitourinary: Penis normal. No penile tenderness. Musculoskeletal: Normal range of motion. He exhibits no edema or tenderness. Lymphadenopathy:     He has no cervical adenopathy. Neurological: He is alert and oriented to person, place, and time. He has normal strength. No sensory deficit (no numbness or tingling). Skin: Skin is warm and dry. No rash noted. Psychiatric: He has a normal mood and affect. ASSESSMENT      ICD-10-CM ICD-9-CM    1. Hematochezia K92.1 578.1 O&P PANEL ([facility]) #1      Clostridium Difficile Toxin/Antigen      Fecal leukocytes      Culture Stool      O&P SCREEN(GIARDIA/CRYPTOSPORIDIUM) #1   2.  Diarrhea of presumed infectious origin A09 009.3 O&P PANEL ([facility] [employer]) #1      Clostridium Difficile

## 2018-07-22 LAB
CULTURE, STOOL: NORMAL
E COLI SHIGA TOXIN ASSAY: NORMAL

## 2018-07-23 ENCOUNTER — TELEPHONE (OUTPATIENT)
Dept: PRIMARY CARE CLINIC | Age: 23
End: 2018-07-23

## 2019-11-21 ENCOUNTER — OFFICE VISIT (OUTPATIENT)
Dept: PRIMARY CARE CLINIC | Age: 24
End: 2019-11-21
Payer: COMMERCIAL

## 2019-11-21 VITALS
DIASTOLIC BLOOD PRESSURE: 70 MMHG | TEMPERATURE: 98.3 F | OXYGEN SATURATION: 98 % | SYSTOLIC BLOOD PRESSURE: 134 MMHG | BODY MASS INDEX: 25.05 KG/M2 | HEIGHT: 70 IN | HEART RATE: 64 BPM | WEIGHT: 175 LBS

## 2019-11-21 DIAGNOSIS — H65.111 ACUTE MUCOID OTITIS MEDIA OF RIGHT EAR: Primary | ICD-10-CM

## 2019-11-21 PROCEDURE — 99213 OFFICE O/P EST LOW 20 MIN: CPT | Performed by: NURSE PRACTITIONER

## 2019-11-21 RX ORDER — CEFDINIR 300 MG/1
300 CAPSULE ORAL 2 TIMES DAILY
Qty: 20 CAPSULE | Refills: 0 | Status: SHIPPED | OUTPATIENT
Start: 2019-11-21 | End: 2019-12-01

## 2019-11-21 RX ORDER — FLUTICASONE PROPIONATE 50 MCG
SPRAY, SUSPENSION (ML) NASAL
Qty: 1 BOTTLE | Refills: 11 | Status: SHIPPED | OUTPATIENT
Start: 2019-11-21 | End: 2021-06-18

## 2019-11-21 ASSESSMENT — ENCOUNTER SYMPTOMS
BLOOD IN STOOL: 0
WHEEZING: 0
DIARRHEA: 0
EYE DISCHARGE: 0
CONSTIPATION: 0
EYE REDNESS: 0
SORE THROAT: 1
COUGH: 0
RHINORRHEA: 1
CHOKING: 0

## 2019-11-21 ASSESSMENT — PATIENT HEALTH QUESTIONNAIRE - PHQ9
SUM OF ALL RESPONSES TO PHQ9 QUESTIONS 1 & 2: 0
1. LITTLE INTEREST OR PLEASURE IN DOING THINGS: 0
2. FEELING DOWN, DEPRESSED OR HOPELESS: 0
SUM OF ALL RESPONSES TO PHQ QUESTIONS 1-9: 0
SUM OF ALL RESPONSES TO PHQ QUESTIONS 1-9: 0

## 2020-02-24 ENCOUNTER — OFFICE VISIT (OUTPATIENT)
Dept: PRIMARY CARE CLINIC | Age: 25
End: 2020-02-24
Payer: COMMERCIAL

## 2020-02-24 VITALS
TEMPERATURE: 102.4 F | OXYGEN SATURATION: 98 % | HEIGHT: 71 IN | HEART RATE: 111 BPM | WEIGHT: 178.25 LBS | SYSTOLIC BLOOD PRESSURE: 128 MMHG | DIASTOLIC BLOOD PRESSURE: 74 MMHG | BODY MASS INDEX: 24.95 KG/M2

## 2020-02-24 LAB
INFLUENZA A ANTIBODY: NEGATIVE
INFLUENZA B ANTIBODY: NORMAL
S PYO AG THROAT QL: NORMAL

## 2020-02-24 PROCEDURE — 87880 STREP A ASSAY W/OPTIC: CPT | Performed by: NURSE PRACTITIONER

## 2020-02-24 PROCEDURE — 99214 OFFICE O/P EST MOD 30 MIN: CPT | Performed by: NURSE PRACTITIONER

## 2020-02-24 PROCEDURE — 87804 INFLUENZA ASSAY W/OPTIC: CPT | Performed by: NURSE PRACTITIONER

## 2020-02-24 RX ORDER — ONDANSETRON 4 MG/1
4 TABLET, ORALLY DISINTEGRATING ORAL 3 TIMES DAILY PRN
Qty: 21 TABLET | Refills: 0 | Status: SHIPPED | OUTPATIENT
Start: 2020-02-24 | End: 2021-06-18

## 2020-02-24 RX ORDER — OSELTAMIVIR PHOSPHATE 75 MG/1
75 CAPSULE ORAL 2 TIMES DAILY
Qty: 10 CAPSULE | Refills: 0 | Status: SHIPPED | OUTPATIENT
Start: 2020-02-24 | End: 2020-02-29

## 2020-02-24 ASSESSMENT — ENCOUNTER SYMPTOMS
DIARRHEA: 0
TROUBLE SWALLOWING: 0
WHEEZING: 0
ABDOMINAL PAIN: 0
CONSTIPATION: 0
VOMITING: 0
COUGH: 1
SHORTNESS OF BREATH: 0
NAUSEA: 1
SORE THROAT: 1

## 2020-02-24 NOTE — PROGRESS NOTES
Sheryl [address]  [place], [address] [place] [address] Rd  Phone [phone]   Fax [phone]      OFFICE VISIT: 2020    Bee Barron- : 1995      Reason For Visit:  Jv Sanchez is a 22 y.o. Lewayne Bosworth is here for Pharyngitis (started coughing yesterday, no other symptoms, today has just felt worse as the day has gone on.) and Influenza         Health Maintenance     HPI    Patient is here for sick visit  Started yesterday with a small cough: And today at work fever congestion and feeling bad  Temp at present 102.4   Reports took some this am  Some nausea  Achy      Normal healthy  No issues           height is 5' 11\" (1.803 m) and weight is 178 lb 4 oz (80.9 kg). His temporal temperature is 102.4 °F (39.1 °C). His blood pressure is 128/74 and his pulse is 111. His oxygen saturation is 98%. Body mass index is 24.86 kg/m². Results for orders placed or performed in visit on 20   POCT rapid strep A   Result Value Ref Range    Strep A Ag None Detected None Detected   POCT Influenza A/B   Result Value Ref Range    Influenza A Ab negative     Influenza B Ab postivie        I have reviewed the following with the Mr. Garvey   Lab Review   No visits with results within 6 Month(s) from this visit.    Latest known visit with results is:   Orders Only on 2018   Component Date Value    C difficile Toxin, EIA 2018                      Value:Result: Negative for Toxigenic C. difficile by EIA  Normal Range: Negative      LACTOFERRIN, FECAL 2018 Negative     Culture, Stool 2018                      Value:Normal enteric davian  No Salmonella, Shigella, Campylobacter, E. coli 0157 isolated      E coli, Shiga toxin Assay 2018                      Value:Negative-  No Shiga toxins 1 and 2 detected  Normal Range:  Not detected      Cryptosporidium Ag 2018                      Value:Negative  Normal Range: Negative      Giardia Ag, Stl 2018                      Value:Negative  Normal Range: normal. There is no impacted cerumen. Left Ear: Tympanic membrane normal. There is no impacted cerumen. Nose: Congestion and rhinorrhea present. Mouth/Throat:      Pharynx: No posterior oropharyngeal erythema. Eyes:      General:         Right eye: No discharge. Left eye: No discharge. Neck:      Musculoskeletal: Normal range of motion. Cardiovascular:      Rate and Rhythm: Normal rate and regular rhythm. Pulses: Normal pulses. Heart sounds: No murmur. Pulmonary:      Effort: Pulmonary effort is normal. No respiratory distress. Breath sounds: No wheezing or rhonchi. Abdominal:      General: Bowel sounds are normal.   Musculoskeletal: Normal range of motion. Lymphadenopathy:      Cervical: No cervical adenopathy. Skin:     General: Skin is warm. Capillary Refill: Capillary refill takes less than 2 seconds. Neurological:      General: No focal deficit present. Mental Status: He is alert and oriented to person, place, and time. Psychiatric:         Mood and Affect: Mood normal.         Behavior: Behavior normal.         ASSESSMENT/ PLAN    1. Fever, unspecified fever cause  Tylenol and motrin  - POCT rapid strep A  - POCT Influenza A/B    2. Nausea  Cont as needed  - ondansetron (ZOFRAN-ODT) 4 MG disintegrating tablet; Take 1 tablet by mouth 3 times daily as needed for Nausea or Vomiting  Dispense: 21 tablet; Refill: 0    3. Influenza B  Start tamiflu   Both doses today  - oseltamivir (TAMIFLU) 75 MG capsule; Take 1 capsule by mouth 2 times daily for 5 days  Dispense: 10 capsule; Refill: 0      Orders Placed This Encounter   Procedures    POCT rapid strep A    POCT Influenza A/B        No follow-ups on file. Patient Instructions       Patient Education        Influenza (Flu): Care Instructions  Your Care Instructions    Influenza (flu) is an infection in the lungs and breathing passages. It is caused by the influenza virus.  There are different or for people with certain health problems. · If the skin around your nose and lips becomes sore, put some petroleum jelly on the area. · To ease coughing:  ? Drink fluids to soothe a scratchy throat. ? Suck on cough drops or plain hard candy. ? Take an over-the-counter cough medicine that contains dextromethorphan to help you get some sleep. Read and follow all instructions on the label. ? Raise your head at night with an extra pillow. This may help you rest if coughing keeps you awake. · Take any prescribed medicine exactly as directed. Call your doctor if you think you are having a problem with your medicine. To avoid spreading the flu  · Wash your hands regularly, and keep your hands away from your face. · Stay home from school, work, and other public places until you are feeling better and your fever has been gone for at least 24 hours. The fever needs to have gone away on its own without the help of medicine. · Ask people living with you to talk to their doctors about preventing the flu. They may get antiviral medicine to keep from getting the flu from you. · To prevent the flu in the future, get a flu vaccine every fall. Encourage people living with you to get the vaccine. · Cover your mouth when you cough or sneeze. When should you call for help? Call 911 anytime you think you may need emergency care. For example, call if:    · You have severe trouble breathing.    Call your doctor now or seek immediate medical care if:    · You have new or worse trouble breathing.     · You seem to be getting much sicker.     · You feel very sleepy or confused.     · You have a new or higher fever.     · You get a new rash.    Watch closely for changes in your health, and be sure to contact your doctor if:    · You begin to get better and then get worse.     · You are not getting better after 1 week. Where can you learn more? Go to https://ching.Pacinian. org and sign in to your [employer] account.

## 2020-02-24 NOTE — PATIENT INSTRUCTIONS
Patient Education        Influenza (Flu): Care Instructions  Your Care Instructions    Influenza (flu) is an infection in the lungs and breathing passages. It is caused by the influenza virus. There are different strains, or types, of the flu virus from year to year. Unlike the common cold, the flu comes on suddenly and the symptoms, such as a cough, congestion, fever, chills, fatigue, aches, and pains, are more severe. These symptoms may last up to 10 days. Although the flu can make you feel very sick, it usually doesn't cause serious health problems. Home treatment is usually all you need for flu symptoms. But your doctor may prescribe antiviral medicine to prevent other health problems, such as pneumonia, from developing. Older people and those who have a long-term health condition, such as lung disease, are most at risk for having pneumonia or other health problems. Follow-up care is a key part of your treatment and safety. Be sure to make and go to all appointments, and call your doctor if you are having problems. It's also a good idea to know your test results and keep a list of the medicines you take. How can you care for yourself at home? · Get plenty of rest.  · Drink plenty of fluids, enough so that your urine is light yellow or clear like water. If you have kidney, heart, or liver disease and have to limit fluids, talk with your doctor before you increase the amount of fluids you drink. · Take an over-the-counter pain medicine if needed, such as acetaminophen (Tylenol), ibuprofen (Advil, Motrin), or naproxen (Aleve), to relieve fever, headache, and muscle aches. Read and follow all instructions on the label. No one younger than 20 should take aspirin. It has been linked to Reye syndrome, a serious illness. · Do not smoke. Smoking can make the flu worse. If you need help quitting, talk to your doctor about stop-smoking programs and medicines.  These can increase your chances of quitting for your doctor if:    · You begin to get better and then get worse.     · You are not getting better after 1 week. Where can you learn more? Go to https://Omnilink Systemspepiceweb.[email or web address]. org and sign in to your [employer] account. Enter N292 in the WeWork box to learn more about \"Influenza (Flu): Care Instructions. \"     If you do not have an account, please click on the \"Sign Up Now\" link. Current as of: June 9, 2019  Content Version: 12.3  © 9993-8015 Healthwise, Incorporated. Care instructions adapted under license by [facility] ([facility]). If you have questions about a medical condition or this instruction, always ask your healthcare professional. [address] any warranty or liability for your use of this information.

## 2021-06-18 ENCOUNTER — OFFICE VISIT (OUTPATIENT)
Dept: PRIMARY CARE CLINIC | Age: 26
End: 2021-06-18
Payer: COMMERCIAL

## 2021-06-18 VITALS
SYSTOLIC BLOOD PRESSURE: 120 MMHG | HEIGHT: 70 IN | DIASTOLIC BLOOD PRESSURE: 72 MMHG | OXYGEN SATURATION: 99 % | BODY MASS INDEX: 26.63 KG/M2 | WEIGHT: 186 LBS | TEMPERATURE: 98 F | HEART RATE: 75 BPM

## 2021-06-18 DIAGNOSIS — L25.5 PLANT DERMATITIS: Primary | ICD-10-CM

## 2021-06-18 PROCEDURE — 99213 OFFICE O/P EST LOW 20 MIN: CPT | Performed by: NURSE PRACTITIONER

## 2021-06-18 PROCEDURE — 96372 THER/PROPH/DIAG INJ SC/IM: CPT | Performed by: NURSE PRACTITIONER

## 2021-06-18 RX ORDER — TRIAMCINOLONE ACETONIDE 5 MG/G
CREAM TOPICAL
Qty: 60 G | Refills: 0 | Status: SHIPPED | OUTPATIENT
Start: 2021-06-18 | End: 2022-01-03

## 2021-06-18 RX ORDER — DEXAMETHASONE SODIUM PHOSPHATE 4 MG/ML
4 INJECTION, SOLUTION INTRA-ARTICULAR; INTRALESIONAL; INTRAMUSCULAR; INTRAVENOUS; SOFT TISSUE ONCE
Status: COMPLETED | OUTPATIENT
Start: 2021-06-18 | End: 2021-06-18

## 2021-06-18 RX ADMIN — DEXAMETHASONE SODIUM PHOSPHATE 4 MG: 4 INJECTION, SOLUTION INTRA-ARTICULAR; INTRALESIONAL; INTRAMUSCULAR; INTRAVENOUS; SOFT TISSUE at 13:58

## 2021-06-18 SDOH — ECONOMIC STABILITY: FOOD INSECURITY: WITHIN THE PAST 12 MONTHS, THE FOOD YOU BOUGHT JUST DIDN'T LAST AND YOU DIDN'T HAVE MONEY TO GET MORE.: NEVER TRUE

## 2021-06-18 SDOH — ECONOMIC STABILITY: FOOD INSECURITY: WITHIN THE PAST 12 MONTHS, YOU WORRIED THAT YOUR FOOD WOULD RUN OUT BEFORE YOU GOT MONEY TO BUY MORE.: NEVER TRUE

## 2021-06-18 ASSESSMENT — PATIENT HEALTH QUESTIONNAIRE - PHQ9
1. LITTLE INTEREST OR PLEASURE IN DOING THINGS: 0
SUM OF ALL RESPONSES TO PHQ9 QUESTIONS 1 & 2: 0
SUM OF ALL RESPONSES TO PHQ QUESTIONS 1-9: 0
2. FEELING DOWN, DEPRESSED OR HOPELESS: 0

## 2021-06-18 ASSESSMENT — ENCOUNTER SYMPTOMS
SORE THROAT: 0
COUGH: 0
EYE REDNESS: 0
EYE DISCHARGE: 0
WHEEZING: 0
SHORTNESS OF BREATH: 0

## 2021-06-18 ASSESSMENT — SOCIAL DETERMINANTS OF HEALTH (SDOH): HOW HARD IS IT FOR YOU TO PAY FOR THE VERY BASICS LIKE FOOD, HOUSING, MEDICAL CARE, AND HEATING?: NOT HARD AT ALL

## 2021-06-18 NOTE — PROGRESS NOTES
Carolyne Garvey (:  1995) is a 26 y.o. male,Established patient, here for evaluation of the following chief complaint(s):  Poison Ivy      ASSESSMENT/PLAN:    ICD-10-CM    1. Plant dermatitis  L25.5 dexamethasone (DECADRON) injection 4 mg       Return if symptoms worsen or fail to improve. SUBJECTIVE/OBJECTIVE:  HPI  Rash  Was out working in the yard. Has poison ivy. Itches. Review of Systems   Constitutional: Negative for chills. HENT: Negative for sore throat. Eyes: Negative for discharge and redness. Respiratory: Negative for cough, shortness of breath and wheezing. Cardiovascular: Negative for chest pain. Skin: Positive for rash. /72 (Site: Right Upper Arm, Position: Sitting, Cuff Size: Large Adult)   Pulse 75   Temp 98 °F (36.7 °C) (Temporal)   Ht 5' 10\" (1.778 m)   Wt 186 lb (84.4 kg)   SpO2 99%   BMI 26.69 kg/m²    Physical Exam  Vitals reviewed. Constitutional:       Appearance: He is well-developed. HENT:      Head: Normocephalic. Eyes:      Conjunctiva/sclera: Conjunctivae normal.   Cardiovascular:      Rate and Rhythm: Normal rate and regular rhythm. Pulmonary:      Effort: Pulmonary effort is normal.   Musculoskeletal:      Cervical back: Normal range of motion and neck supple. Skin:     General: Skin is warm and dry. Findings: Rash present. Comments: Papulovesicular rash in a linear array located arms legs   Neurological:      Mental Status: He is alert and oriented to person, place, and time. Psychiatric:         Behavior: Behavior normal.                 An electronic signature was used to authenticate this note.     --ARTURO Mendez

## 2021-12-27 ENCOUNTER — NURSE ONLY (OUTPATIENT)
Dept: PRIMARY CARE CLINIC | Age: 26
End: 2021-12-27
Payer: COMMERCIAL

## 2021-12-27 DIAGNOSIS — Z23 NEED FOR COVID-19 VACCINE: Primary | ICD-10-CM

## 2021-12-27 PROCEDURE — 91300 COVID-19, PFIZER VACCINE 30MCG/0.3ML DOSE: CPT | Performed by: PEDIATRICS

## 2021-12-27 PROCEDURE — 0004A COVID-19, PFIZER VACCINE 30MCG/0.3ML DOSE: CPT | Performed by: PEDIATRICS

## 2021-12-27 NOTE — PROGRESS NOTES
After obtaining consent, and per orders of Dr. Ty Fam, injection of Pfizer Covid 0.3 mL Booster was given in the Left deltoid . Patient tolerated it well. Patient instructed to report any adverse reaction to me immediately. Luis GILLIAM PF, 30mcg/0.3mL      Current Status      Updated on: 12/27/2021  1:30 PM    Name Date Status Dose VIS Date Route Site  Lot# Given By Verified By   Luis GILLIAM PF, 30mcg/0.3mL 12/27/2021 Given 0.3 mL 10/29/2021 IM left delt Pfizer [id number] Gabriel Pals --   Exp. Date [address] # Product Time Location External Comment   5/30/2022 [address]-3 Pfizer-BioNTech COVID-19 Vacc -- -- -- --   Question Answer Comment   VIS/EUA reviewed with patient and questions answered? Yes    VIS/EUA Given Date 12/27/2021    COVID-19 Vaccine Dose Booster    Pandemic Funds used for this vaccine?  Yes    Target Population Age 12+    Updated by: Gabriel Pals

## 2022-01-03 ENCOUNTER — OFFICE VISIT (OUTPATIENT)
Dept: PRIMARY CARE CLINIC | Age: 27
End: 2022-01-03
Payer: COMMERCIAL

## 2022-01-03 VITALS
HEART RATE: 89 BPM | BODY MASS INDEX: 26.85 KG/M2 | TEMPERATURE: 99.1 F | WEIGHT: 187.13 LBS | SYSTOLIC BLOOD PRESSURE: 118 MMHG | OXYGEN SATURATION: 99 % | DIASTOLIC BLOOD PRESSURE: 78 MMHG

## 2022-01-03 DIAGNOSIS — Z11.52 ENCOUNTER FOR SCREENING FOR COVID-19: Primary | ICD-10-CM

## 2022-01-03 LAB — SARS-COV-2, PCR: NOT DETECTED

## 2022-01-03 PROCEDURE — 99213 OFFICE O/P EST LOW 20 MIN: CPT | Performed by: NURSE PRACTITIONER

## 2022-01-03 ASSESSMENT — ENCOUNTER SYMPTOMS
SHORTNESS OF BREATH: 0
BACK PAIN: 0
SORE THROAT: 0
SINUS PRESSURE: 1
COUGH: 1
WHEEZING: 0
ABDOMINAL PAIN: 0
TROUBLE SWALLOWING: 0

## 2022-01-03 NOTE — PROGRESS NOTES
Rogers Garvey (:  1995) is a 23 y.o. male,Established patient, here for evaluation of the following chief complaint(s):  Generalized Body Aches (had booster last monday, started to feel bad on friday, brother in law had covid) and Congestion      ASSESSMENT/PLAN:    ICD-10-CM    1. Encounter for screening for COVID-19  Z11.52 COVID-19  Will isolate till returns  Has my chart  Will check           Return if symptoms worsen or fail to improve. SUBJECTIVE/OBJECTIVE:  HPI    Patient is here for sick visit    Reports started Friday with fatigue and achy in legs  Reports had the covid booster on Monday  Reports not to tired arm   But did well with this     But Friday things started going [address]  Noted Friday brother in law had covid  Has been around all week before knew he had it    Reports her daughter in same class on    Reports no cough   Just achy   Reports slight headache   Lots of sinus pressure    /78 (Site: Right Upper Arm, Position: Sitting, Cuff Size: Large Adult)   Pulse 89   Temp 99.1 °F (37.3 °C) (Temporal)   Wt 187 lb 2 oz (84.9 kg)   SpO2 99%   BMI 26.85 kg/m²   Review of Systems   Constitutional: Negative for activity change, appetite change, chills, diaphoresis and fever. HENT: Positive for postnasal drip and sinus pressure. Negative for congestion, ear pain, sneezing, sore throat and trouble swallowing. Respiratory: Positive for cough. Negative for shortness of breath and wheezing. Gastrointestinal: Negative for abdominal pain. Musculoskeletal: Negative for arthralgias, back pain and myalgias. Skin: Negative for rash. Neurological: Negative for seizures. Hematological: Does not bruise/bleed easily. Psychiatric/Behavioral: Negative for behavioral problems, self-injury and sleep disturbance. The patient is not nervous/anxious and is not hyperactive. Physical Exam  Vitals reviewed. Constitutional:       General: He is not in acute distress. Appearance: Normal appearance. He is not ill-appearing. HENT:      Right Ear: There is no impacted cerumen. Left Ear: There is no impacted cerumen. Nose: Congestion and rhinorrhea present. Mouth/Throat:      Pharynx: No posterior oropharyngeal erythema. Eyes:      General:         Right eye: No discharge. Left eye: No discharge. Cardiovascular:      Rate and Rhythm: Normal rate and regular rhythm. Pulses: Normal pulses. Heart sounds: No murmur heard. Pulmonary:      Effort: Pulmonary effort is normal.      Breath sounds: Normal breath sounds. No wheezing or rhonchi. Abdominal:      General: Bowel sounds are normal.   Musculoskeletal:         General: No swelling. Skin:     Findings: No rash. Neurological:      General: No focal deficit present. Mental Status: He is alert and oriented to person, place, and time. Psychiatric:         Mood and Affect: Mood normal.         Behavior: Behavior normal.                   An electronic signature was used to authenticate this note.     --ARTURO Bautista

## 2022-01-03 NOTE — PATIENT INSTRUCTIONS
doctor's instructions about what to do when you get your results back. Be sure to wear a mask and follow social-distancing guidelines after you get your results, even if the test is negative. If you're fully vaccinated and were exposed to COVID-19, wear a mask in public indoor spaces until you test negative for COVID-19. What do your results mean? The result is either positive or negative. A positive result means that the antigen or the genetic material of the virus was found in your sample. You have COVID-19 now. A negative result means that the antigen or the genetic material was not found. This may mean that you don't have COVID-19. But it's possible to get a \"false-negative\" result. This means that the test shows that you don't have COVID-19 when in fact you do. This may happen because you were tested too soon after you were infected, before the virus started to spread in your nose and throat. Or it could happen because the swab missed the infection. If you get a negative result for an antigen test, your doctor may recommend that you get another test, such as polymerase chain reaction (PCR), to make sure you don't have the virus. In general, PCR is more accurate than an antigen test.  Some test results come back in a few minutes. Others may take a few days. If your test is negative, follow your doctor's advice for when you can go back to activities. If your test is positive, talk to your doctor or a public health official about what you need to do. Where can you learn more? Go to [email or web address]. org and sign in to your [employer] account. Enter A129 in the [facility] box to learn more about \"COVID-19 Viral Test: About This Test.\"     If you do not have an account, please click on the \"Sign Up Now\" link. Current as of: March 26, 2021               Content Version: 13.1  © 5219-1228 Healthwise, Incorporated. Care instructions adapted under license by [facility] ([facility]).  If you have questions about a medical condition or this instruction, always ask your healthcare professional. [place] [address] any warranty or liability for your use of this information.

## 2022-01-04 ENCOUNTER — TELEPHONE (OUTPATIENT)
Dept: PRIMARY CARE CLINIC | Age: 27
End: 2022-01-04

## 2022-02-10 ENCOUNTER — OFFICE VISIT (OUTPATIENT)
Dept: PRIMARY CARE CLINIC | Age: 27
End: 2022-02-10
Payer: COMMERCIAL

## 2022-02-10 VITALS
SYSTOLIC BLOOD PRESSURE: 140 MMHG | WEIGHT: 185.6 LBS | DIASTOLIC BLOOD PRESSURE: 82 MMHG | TEMPERATURE: 99 F | HEART RATE: 57 BPM | BODY MASS INDEX: 26.63 KG/M2 | OXYGEN SATURATION: 98 %

## 2022-02-10 DIAGNOSIS — R05.9 COUGH: ICD-10-CM

## 2022-02-10 DIAGNOSIS — B34.9 VIRAL ILLNESS: Primary | ICD-10-CM

## 2022-02-10 DIAGNOSIS — U07.1 COVID: ICD-10-CM

## 2022-02-10 LAB — SARS-COV-2, PCR: DETECTED

## 2022-02-10 PROCEDURE — 99213 OFFICE O/P EST LOW 20 MIN: CPT | Performed by: NURSE PRACTITIONER

## 2022-02-10 ASSESSMENT — ENCOUNTER SYMPTOMS
SORE THROAT: 0
TROUBLE SWALLOWING: 0
ABDOMINAL PAIN: 0
NAUSEA: 0
CONSTIPATION: 0
DIARRHEA: 0
RHINORRHEA: 0
SHORTNESS OF BREATH: 0
SINUS PRESSURE: 1
VOMITING: 0
COUGH: 1

## 2022-02-10 NOTE — PROGRESS NOTES
Ela Garvey (:  1995) is a 23 y.o. male,Established patient, here for evaluation of the following chief complaint(s):  Pharyngitis, Cough (patient tested positive for covid today with a rapid test. He has been coughing up yellow and green mucus. He has had a lot of drainage. Symptoms started last wednesday on the ), Fatigue, and Headache      ASSESSMENT/PLAN:    ICD-10-CM    1. Viral illness  B34.9    2. Cough  R05.9 COVID-19   3. COVID  U07.1 covid positive on rapid at work this morning. Increase fluids  Tylenol and/or motrin over the counter for fever or pain  Over the counter cough/cold medicine   Rest when able  If throat is sore, warm salt water rinses and/or throat lozenges  May take over the counter zinc, vitamin d and vitamin c to help assist immune system. Quarantine until results. Frequent deep breaths and stay up and active in home. Return if symptoms worsen or fail to improve. SUBJECTIVE/OBJECTIVE:  HPI  Here for cough, congestion, HA and fatigue  Symptoms started last week. Cough is productive yellow, mild SOA in the mornings. Have had low grade fever. Been taking tylenol and ibuprofen. Also using vicks cough drops. BP (!) 140/82 (Site: Left Upper Arm, Position: Sitting, Cuff Size: Large Adult)   Pulse 57   Temp 99 °F (37.2 °C) (Temporal)   Wt 185 lb 9.6 oz (84.2 kg)   SpO2 98%   BMI 26.63 kg/m²     Review of Systems   Constitutional: Positive for fever. Negative for activity change, appetite change, fatigue and unexpected weight change. HENT: Positive for congestion and sinus pressure. Negative for ear pain, rhinorrhea, sore throat and trouble swallowing. Eyes: Negative for visual disturbance. Respiratory: Positive for cough. Negative for shortness of breath. Cardiovascular: Negative for chest pain, palpitations and leg swelling. Gastrointestinal: Negative for abdominal pain, constipation, diarrhea, nausea and vomiting.    Genitourinary: Negative for flank pain. Musculoskeletal: Negative for arthralgias, myalgias, neck pain and neck stiffness. Neurological: Positive for headaches. Psychiatric/Behavioral: Negative for decreased concentration and sleep disturbance. The patient is not nervous/anxious. Physical Exam  Vitals reviewed. Constitutional:       Appearance: Normal appearance. He is well-developed. HENT:      Head: Normocephalic and atraumatic. Right Ear: Tympanic membrane, ear canal and external ear normal.      Left Ear: Tympanic membrane, ear canal and external ear normal.      Nose: Nose normal.      Comments: masked     Mouth/Throat:      Mouth: Mucous membranes are moist.      Pharynx: Oropharynx is clear. Comments: masked  Eyes:      Conjunctiva/sclera: Conjunctivae normal.   Cardiovascular:      Rate and Rhythm: Normal rate and regular rhythm. Heart sounds: Normal heart sounds. Pulmonary:      Effort: Pulmonary effort is normal.      Breath sounds: Normal breath sounds. Abdominal:      General: Bowel sounds are normal. There is no distension. Palpations: Abdomen is soft. Tenderness: There is no abdominal tenderness. Musculoskeletal:         General: Normal range of motion. Cervical back: Normal range of motion and neck supple. Skin:     General: Skin is warm and dry. Neurological:      Mental Status: He is alert. An electronic signature was used to authenticate this note.     --ARTURO Malone

## 2022-02-10 NOTE — PATIENT INSTRUCTIONS
Increase fluids  Tylenol and/or motrin over the counter for fever or pain  Over the counter cough/cold medicine   Rest when able  If throat is sore, warm salt water rinses and/or throat lozenges  May take over the counter zinc, vitamin d and vitamin c to help assist immune system. Quarantine until results. Frequent deep breaths and stay up and active in home.

## 2022-02-22 ENCOUNTER — OFFICE VISIT (OUTPATIENT)
Dept: PRIMARY CARE CLINIC | Age: 27
End: 2022-02-22
Payer: COMMERCIAL

## 2022-02-22 VITALS
DIASTOLIC BLOOD PRESSURE: 78 MMHG | BODY MASS INDEX: 26.98 KG/M2 | WEIGHT: 188 LBS | OXYGEN SATURATION: 98 % | SYSTOLIC BLOOD PRESSURE: 124 MMHG | TEMPERATURE: 97.8 F | HEART RATE: 79 BPM

## 2022-02-22 DIAGNOSIS — J02.9 SORE THROAT: ICD-10-CM

## 2022-02-22 DIAGNOSIS — J02.9 SORE THROAT: Primary | ICD-10-CM

## 2022-02-22 LAB — S PYO AG THROAT QL: NORMAL

## 2022-02-22 PROCEDURE — 96372 THER/PROPH/DIAG INJ SC/IM: CPT | Performed by: NURSE PRACTITIONER

## 2022-02-22 PROCEDURE — 87880 STREP A ASSAY W/OPTIC: CPT | Performed by: NURSE PRACTITIONER

## 2022-02-22 PROCEDURE — 99213 OFFICE O/P EST LOW 20 MIN: CPT | Performed by: NURSE PRACTITIONER

## 2022-02-22 RX ORDER — TRIAMCINOLONE ACETONIDE 40 MG/ML
40 INJECTION, SUSPENSION INTRA-ARTICULAR; INTRAMUSCULAR ONCE
Status: COMPLETED | OUTPATIENT
Start: 2022-02-22 | End: 2022-02-22

## 2022-02-22 RX ORDER — DEXAMETHASONE SODIUM PHOSPHATE 4 MG/ML
4 INJECTION, SOLUTION INTRA-ARTICULAR; INTRALESIONAL; INTRAMUSCULAR; INTRAVENOUS; SOFT TISSUE ONCE
Status: COMPLETED | OUTPATIENT
Start: 2022-02-22 | End: 2022-02-22

## 2022-02-22 RX ADMIN — TRIAMCINOLONE ACETONIDE 40 MG: 40 INJECTION, SUSPENSION INTRA-ARTICULAR; INTRAMUSCULAR at 16:31

## 2022-02-22 RX ADMIN — DEXAMETHASONE SODIUM PHOSPHATE 4 MG: 4 INJECTION, SOLUTION INTRA-ARTICULAR; INTRALESIONAL; INTRAMUSCULAR; INTRAVENOUS; SOFT TISSUE at 16:31

## 2022-02-22 ASSESSMENT — ENCOUNTER SYMPTOMS
NAUSEA: 0
VOMITING: 0
DIARRHEA: 0
COUGH: 0
SHORTNESS OF BREATH: 0
RHINORRHEA: 0
TROUBLE SWALLOWING: 0
SORE THROAT: 1
CONSTIPATION: 0
ABDOMINAL PAIN: 0

## 2022-02-22 NOTE — PROGRESS NOTES
Willy Garvey (:  1995) is a 23 y.o. male,Established patient, here for evaluation of the following chief complaint(s):  Post-COVID Symptoms (over 10 days out. has had a change in sore throat. now feels like tonsils are causing the pain. )      ASSESSMENT/PLAN:    ICD-10-CM    1. Sore throat  J02.9 POCT rapid strep A     Culture, Throat    Exam is normal.  There is no adenopathy on exam.  Going to treat with Kenalog and Decadron to see if that helps with inflammation. Going obtain throat culture as rapid strep is negative       Return if symptoms worsen or fail to improve. SUBJECTIVE/OBJECTIVE:  HPI  Patient here for sore throat. Patient states that the pain is in his tonsils bilaterally  He denies any fever  He was diagnosed with Covid on  02/10/2022  He has been taking Tylenol for symptoms  Hurts to swallow  Sore throat is constant  He denies any other symptoms  /78   Pulse 79   Temp 97.8 °F (36.6 °C) (Temporal)   Wt 188 lb (85.3 kg)   SpO2 98%   BMI 26.98 kg/m²     Review of Systems   Constitutional: Negative for activity change, appetite change, fatigue, fever and unexpected weight change. HENT: Positive for sore throat. Negative for ear pain, rhinorrhea and trouble swallowing. Eyes: Negative for visual disturbance. Respiratory: Negative for cough and shortness of breath. Cardiovascular: Negative for chest pain, palpitations and leg swelling. Gastrointestinal: Negative for abdominal pain, constipation, diarrhea, nausea and vomiting. Genitourinary: Negative for flank pain. Musculoskeletal: Negative for arthralgias, myalgias, neck pain and neck stiffness. Neurological: Negative for headaches. Psychiatric/Behavioral: Negative for decreased concentration and sleep disturbance. The patient is not nervous/anxious. Physical Exam  Constitutional:       Appearance: He is well-developed. HENT:      Head: Normocephalic and atraumatic.       Right Ear: Tympanic membrane and external ear normal.      Left Ear: Tympanic membrane and external ear normal.      Nose: Nose normal.      Comments: masked     Mouth/Throat:      Mouth: Mucous membranes are moist.      Pharynx: Oropharynx is clear. Comments: masked  Eyes:      Conjunctiva/sclera: Conjunctivae normal.   Cardiovascular:      Rate and Rhythm: Normal rate and regular rhythm. Heart sounds: Normal heart sounds. Pulmonary:      Effort: Pulmonary effort is normal.      Breath sounds: Normal breath sounds. Abdominal:      Palpations: Abdomen is soft. Tenderness: There is no abdominal tenderness. Musculoskeletal:         General: Normal range of motion. Cervical back: Normal range of motion and neck supple. Lymphadenopathy:      Cervical: No cervical adenopathy. Skin:     General: Skin is warm and dry. Neurological:      Mental Status: He is alert. An electronic signature was used to authenticate this note.     --ARTURO Genao

## 2022-02-22 NOTE — PROGRESS NOTES
After obtaining consent from iKanna Mercado, gave patient kenalog 40mg dexamethasone 4mg injection in Right upper quad. gluteus, patient tolerated well. Medication was not supplied by the patient.

## 2022-02-24 ENCOUNTER — TELEPHONE (OUTPATIENT)
Dept: PRIMARY CARE CLINIC | Age: 27
End: 2022-02-24

## 2022-02-24 DIAGNOSIS — A49.2 HAEMOPHILUS INFLUENZAE INFECTION: Primary | ICD-10-CM

## 2022-02-24 LAB
ORGANISM: ABNORMAL
THROAT CULTURE: ABNORMAL
THROAT CULTURE: ABNORMAL

## 2022-02-24 RX ORDER — AMOXICILLIN AND CLAVULANATE POTASSIUM 875; 125 MG/1; MG/1
1 TABLET, FILM COATED ORAL 2 TIMES DAILY
Qty: 20 TABLET | Refills: 0 | Status: SHIPPED | OUTPATIENT
Start: 2022-02-24 | End: 2022-03-06

## 2022-02-24 NOTE — TELEPHONE ENCOUNTER
----- Message from ARTURO Kaur sent at 2/24/2022  3:00 PM CST -----  Please call patient and let them know results. Throat culture shows haemophilus influenza infection. Augmentin 875mg twice a day for 10 days.

## 2022-02-24 NOTE — TELEPHONE ENCOUNTER
Called patient, spoke with: Patient regarding the results of the patients most recent throat culture. I advised Patient of Cristal Duke recommendations.    Patient did voice understanding    Sent rx to pharmacy for pt    Requested Prescriptions     Signed Prescriptions Disp Refills    amoxicillin-clavulanate (AUGMENTIN) 875-125 MG per tablet 20 tablet 0     Sig: Take 1 tablet by mouth 2 times daily for 10 days     Authorizing Provider: Duane Gudino     Ordering User: Shantell Griffith

## 2022-04-08 ENCOUNTER — OFFICE VISIT (OUTPATIENT)
Dept: PRIMARY CARE CLINIC | Age: 27
End: 2022-04-08
Payer: COMMERCIAL

## 2022-04-08 VITALS
DIASTOLIC BLOOD PRESSURE: 70 MMHG | SYSTOLIC BLOOD PRESSURE: 128 MMHG | HEART RATE: 87 BPM | TEMPERATURE: 97.9 F | BODY MASS INDEX: 26.11 KG/M2 | WEIGHT: 182 LBS | OXYGEN SATURATION: 98 %

## 2022-04-08 DIAGNOSIS — J00 ACUTE NASOPHARYNGITIS: Primary | ICD-10-CM

## 2022-04-08 DIAGNOSIS — J02.9 SORE THROAT: ICD-10-CM

## 2022-04-08 LAB — S PYO AG THROAT QL: NORMAL

## 2022-04-08 PROCEDURE — 87880 STREP A ASSAY W/OPTIC: CPT | Performed by: NURSE PRACTITIONER

## 2022-04-08 PROCEDURE — 99213 OFFICE O/P EST LOW 20 MIN: CPT | Performed by: NURSE PRACTITIONER

## 2022-04-08 ASSESSMENT — ENCOUNTER SYMPTOMS
SHORTNESS OF BREATH: 0
VOMITING: 0
CONSTIPATION: 0
ABDOMINAL PAIN: 0
TROUBLE SWALLOWING: 0
COUGH: 0
NAUSEA: 0
DIARRHEA: 0
SORE THROAT: 1
RHINORRHEA: 1

## 2022-04-08 NOTE — PROGRESS NOTES
Maricarmen Garvey (:  1995) is a 23 y.o. male,Established patient, here for evaluation of the following chief complaint(s):  Pharyngitis (cough, runny nose, headache. started yesterday. )      ASSESSMENT/PLAN:    ICD-10-CM    1. Acute nasopharyngitis  J00    2. Sore throat  J02.9 POCT rapid strep A       Return if symptoms worsen or fail to improve. SUBJECTIVE/OBJECTIVE:  HPI  Here for sore throat, runny nose, headache   Onset yesterday. Been taking ibuprofen  No fever  Have been exposed to strep. Throat feels scratchy    /70   Pulse 87   Temp 97.9 °F (36.6 °C) (Temporal)   Wt 182 lb (82.6 kg)   SpO2 98%   BMI 26.11 kg/m²     Review of Systems   Constitutional: Negative for activity change, appetite change, fatigue, fever and unexpected weight change. HENT: Positive for rhinorrhea and sore throat. Negative for ear pain and trouble swallowing. Eyes: Negative for visual disturbance. Respiratory: Negative for cough and shortness of breath. Cardiovascular: Negative for chest pain, palpitations and leg swelling. Gastrointestinal: Negative for abdominal pain, constipation, diarrhea, nausea and vomiting. Genitourinary: Negative for flank pain. Musculoskeletal: Negative for arthralgias, myalgias, neck pain and neck stiffness. Neurological: Positive for headaches. Psychiatric/Behavioral: Negative for decreased concentration and sleep disturbance. The patient is not nervous/anxious. Physical Exam  Vitals reviewed. Constitutional:       Appearance: Normal appearance. HENT:      Head: Normocephalic and atraumatic. Right Ear: Tympanic membrane, ear canal and external ear normal.      Left Ear: Tympanic membrane, ear canal and external ear normal.      Nose: Nose normal.      Mouth/Throat:      Mouth: Mucous membranes are moist.      Pharynx: Oropharynx is clear.    Eyes:      Conjunctiva/sclera: Conjunctivae normal.   Cardiovascular:      Rate and Rhythm: Normal rate and regular rhythm. Pulses: Normal pulses. Heart sounds: Normal heart sounds. Pulmonary:      Effort: Pulmonary effort is normal.      Breath sounds: Normal breath sounds. Abdominal:      General: There is no distension. Palpations: Abdomen is soft. Tenderness: There is no abdominal tenderness. There is no guarding. Musculoskeletal:         General: Normal range of motion. Cervical back: Normal range of motion and neck supple. Skin:     General: Skin is warm. Neurological:      Mental Status: He is alert and oriented to person, place, and time. An electronic signature was used to authenticate this note.     --ARTURO Gannon

## 2022-04-08 NOTE — PATIENT INSTRUCTIONS
Patient Education        Viral Respiratory Infection: Care Instructions  Your Care Instructions     Viruses are very small organisms. They grow in number after they enter your body. There are many types that cause different illnesses, such as colds andthe mumps. The symptoms of a viral respiratory infection often start quickly. They include a fever, sore throat, and runny nose. You may also just not feel well. Or youmay not want to eat much. Most viral respiratory infections are not serious. They usually get better withtime and self-care. Antibiotics are not used to treat a viral infection. That's because antibiotics will not help cure a viral illness. In some cases, antiviral medicine can helpyour body fight a serious viral infection. Follow-up care is a key part of your treatment and safety. Be sure to make and go to all appointments, and call your doctor if you are having problems. It's also a good idea to know your test results and keep alist of the medicines you take. How can you care for yourself at home?  Rest as much as possible until you feel better.  Be safe with medicines. Take your medicine exactly as prescribed. Call your doctor if you think you are having a problem with your medicine. You will get more details on the specific medicine your doctor prescribes.  Take an over-the-counter pain medicine, such as acetaminophen (Tylenol), ibuprofen (Advil, Motrin), or naproxen (Aleve), as needed for pain and fever. Read and follow all instructions on the label. Do not give aspirin to anyone younger than 20. It has been linked to Reye syndrome, a serious illness.  Drink plenty of fluids. Hot fluids, such as tea or soup, may help relieve congestion in your nose and throat. If you have kidney, heart, or liver disease and have to limit fluids, talk with your doctor before you increase the amount of fluids you drink.  Try to clear mucus from your lungs by breathing deeply and coughing.    Gargle with warm salt water once an hour. This can help reduce swelling and throat pain. Use 1 teaspoon of salt mixed in 1 cup of warm water.  Do not smoke or allow others to smoke around you. If you need help quitting, talk to your doctor about stop-smoking programs and medicines. These can increase your chances of quitting for good. To avoid spreading the virus   Cough or sneeze into a tissue. Then throw the tissue away.  If you don't have a tissue, use your hand to cover your cough or sneeze. Then clean your hand. You can also cough into your sleeve.  Wash your hands often. Use soap and warm water. Wash for 15 to 20 seconds each time.  If you don't have soap and water near you, you can clean your hands with alcohol wipes or gel. When should you call for help? Call your doctor now or seek immediate medical care if:     You have a new or higher fever.      Your fever lasts more than 48 hours.      You have trouble breathing.      You have a fever with a stiff neck or a severe headache.      You are sensitive to light.      You feel very sleepy or confused. Watch closely for changes in your health, and be sure to contact your doctor if:     You do not get better as expected. Where can you learn more? Go to https://[employer]. org and sign in to your [employer] account. Enter I040 in the [facility] box to learn more about \"Viral Respiratory Infection: Care Instructions. \"     If you do not have an account, please click on the \"Sign Up Now\" link. Current as of: July 6, 2021               Content Version: 13.2  © 2006-2022 Healthwise, Incorporated. Care instructions adapted under license by [facility] ([facility]). If you have questions about a medical condition or this instruction, always ask your healthcare professional. [place] [address] any warranty or liability for your use of this information.

## 2023-01-24 ENCOUNTER — PATIENT MESSAGE (OUTPATIENT)
Dept: FAMILY MEDICINE CLINIC | Age: 28
End: 2023-01-24

## 2023-01-24 DIAGNOSIS — R05.1 ACUTE COUGH: Primary | ICD-10-CM

## 2023-01-25 NOTE — TELEPHONE ENCOUNTER
This is been going on for a few weeks, we may need an antibiotic to get rid of it. You will eventually need it on your own. We can help to get better little quicker with an antibiotic.   If wanted, we can try doxycycline 100 mg twice daily x10 days  Dispense #20 with no refills

## 2023-01-26 RX ORDER — DOXYCYCLINE HYCLATE 100 MG
100 TABLET ORAL 2 TIMES DAILY
Qty: 20 TABLET | Refills: 0 | Status: SHIPPED | OUTPATIENT
Start: 2023-01-26 | End: 2023-02-05

## 2023-03-30 ENCOUNTER — OFFICE VISIT (OUTPATIENT)
Dept: PRIMARY CARE CLINIC | Age: 28
End: 2023-03-30
Payer: COMMERCIAL

## 2023-03-30 VITALS
DIASTOLIC BLOOD PRESSURE: 72 MMHG | OXYGEN SATURATION: 98 % | TEMPERATURE: 98.4 F | RESPIRATION RATE: 18 BRPM | SYSTOLIC BLOOD PRESSURE: 116 MMHG | HEART RATE: 82 BPM

## 2023-03-30 DIAGNOSIS — J02.9 SORE THROAT: ICD-10-CM

## 2023-03-30 DIAGNOSIS — R05.1 ACUTE COUGH: ICD-10-CM

## 2023-03-30 DIAGNOSIS — J01.90 ACUTE NON-RECURRENT SINUSITIS, UNSPECIFIED LOCATION: Primary | ICD-10-CM

## 2023-03-30 LAB — S PYO AG THROAT QL: NORMAL

## 2023-03-30 PROCEDURE — 99213 OFFICE O/P EST LOW 20 MIN: CPT | Performed by: NURSE PRACTITIONER

## 2023-03-30 PROCEDURE — 87880 STREP A ASSAY W/OPTIC: CPT | Performed by: NURSE PRACTITIONER

## 2023-03-30 RX ORDER — AMOXICILLIN 500 MG/1
500 CAPSULE ORAL 2 TIMES DAILY
Qty: 20 CAPSULE | Refills: 0 | Status: SHIPPED | OUTPATIENT
Start: 2023-03-30 | End: 2023-04-09

## 2023-03-30 RX ORDER — METHYLPREDNISOLONE 4 MG/1
TABLET ORAL
Qty: 1 KIT | Refills: 0 | Status: SHIPPED | OUTPATIENT
Start: 2023-03-30

## 2023-03-30 ASSESSMENT — ENCOUNTER SYMPTOMS
WHEEZING: 0
ALLERGIC/IMMUNOLOGIC NEGATIVE: 1
COUGH: 1
SINUS PRESSURE: 1
SHORTNESS OF BREATH: 0
SINUS PAIN: 1
GASTROINTESTINAL NEGATIVE: 1
EYES NEGATIVE: 1
SORE THROAT: 1

## 2023-03-30 NOTE — PROGRESS NOTES
ear normal.      Left Ear: Tympanic membrane, ear canal and external ear normal.      Nose:      Right Sinus: Frontal sinus tenderness present. No maxillary sinus tenderness. Left Sinus: Frontal sinus tenderness present. No maxillary sinus tenderness. Mouth/Throat:      Lips: Pink. Mouth: Mucous membranes are moist.      Pharynx: Posterior oropharyngeal erythema (postnasal drainage, mild erythema) present. No oropharyngeal exudate. Cardiovascular:      Rate and Rhythm: Normal rate and regular rhythm. Heart sounds: Normal heart sounds. Pulmonary:      Effort: Pulmonary effort is normal.      Breath sounds: Normal breath sounds. Lymphadenopathy:      Head:      Right side of head: No submandibular or tonsillar adenopathy. Left side of head: No submandibular or tonsillar adenopathy. Cervical:      Right cervical: No superficial cervical adenopathy. Left cervical: No superficial cervical adenopathy. Skin:     General: Skin is warm and dry. Neurological:      Mental Status: He is alert. Patient Instructions     Completely finish antibiotic. Take steroid pack as prescribed. Start Claritin daily. Start Flonase nasal spray daily. Coolmist humidifier in bedroom. Warm salt water gargles if experiencing sore throat. Follow-up with your PCP if symptoms persist or worsen. An electronic signature was used to authenticate this note. --ARTURO Cruz - CNP     EMR Dragon/translation disclaimer: Much of this encounter note is an electronic transcription/translation of spoken language to printed text. The electronic translation of spoken language may be erroneous, or at times, nonsensical words or phrases may be inadvertently transcribed.   Although I have reviewed the note for such errors, some may still exist.

## 2023-03-30 NOTE — PATIENT INSTRUCTIONS
Completely finish antibiotic. Take steroid pack as prescribed. Start Claritin daily. Start Flonase nasal spray daily. Coolmist humidifier in bedroom. Warm salt water gargles if experiencing sore throat. Follow-up with your PCP if symptoms persist or worsen.

## 2024-04-25 ENCOUNTER — OFFICE VISIT (OUTPATIENT)
Dept: PRIMARY CARE CLINIC | Age: 29
End: 2024-04-25
Payer: COMMERCIAL

## 2024-04-25 VITALS
DIASTOLIC BLOOD PRESSURE: 78 MMHG | WEIGHT: 173 LBS | SYSTOLIC BLOOD PRESSURE: 120 MMHG | TEMPERATURE: 98.8 F | BODY MASS INDEX: 24.82 KG/M2 | HEART RATE: 62 BPM | OXYGEN SATURATION: 98 %

## 2024-04-25 DIAGNOSIS — J02.9 SORE THROAT: ICD-10-CM

## 2024-04-25 DIAGNOSIS — J02.9 ACUTE PHARYNGITIS, UNSPECIFIED ETIOLOGY: Primary | ICD-10-CM

## 2024-04-25 LAB — S PYO AG THROAT QL: NORMAL

## 2024-04-25 PROCEDURE — 99212 OFFICE O/P EST SF 10 MIN: CPT

## 2024-04-25 PROCEDURE — 87880 STREP A ASSAY W/OPTIC: CPT

## 2024-04-25 ASSESSMENT — ENCOUNTER SYMPTOMS
SINUS PRESSURE: 0
RHINORRHEA: 0
SORE THROAT: 1
VOMITING: 0
NAUSEA: 0
COUGH: 0
WHEEZING: 0
DIARRHEA: 0
ABDOMINAL PAIN: 0

## 2024-04-25 NOTE — PATIENT INSTRUCTIONS
- Strep swab was negative.  - May take OTC Zyrtec/Claritin for any congestion.   - Gargle with warm salt water several times a day to help reduce swelling and relieve pain.   - Popsicles and ice cream can soothe the throat.   - Tylenol/Ibuprofen as needed for fever.   - Increase fluids and rest.   - Place a cool mist humidifier next to bedside.   - Return to the clinic or follow up with PCP if symptoms worsen or fail to improve.

## 2024-04-25 NOTE — PROGRESS NOTES
[facility]  [facility] [address]  [address]  [place] [address]  Dept: [phone]  Dept Fax: [phone]  Loc: [phone]    Apollo Garvey is a 28 y.o. male who presents today for his medical conditions/complaints as noted below.  Apollo Garvey is c/o of Pharyngitis        HPI:     Apollo Garvey presents with complaints of a sore throat and headache. Denies any nasal congestion, fever, N/V/D or body aches. Symptoms began about 1 week ago. OTC treatment includes Ibuprofen and gargled warm salt water. No reported recent administration of antibiotic or steroid. No known sick contact. Request to be strep tested only.      Past Medical History:   Diagnosis Date    Allergic rhinitis      History reviewed. No pertinent surgical history.    History reviewed. No pertinent family history.    Social History     Tobacco Use    Smoking status: Never    Smokeless tobacco: Never   Substance Use Topics    Alcohol use: No      No current outpatient medications on file.     No current facility-administered medications for this visit.     No Known Allergies    Health Maintenance   Topic Date Due    Hepatitis B vaccine (1 of 3 - 3-dose series) Never done    Varicella vaccine (1 of 2 - 2-dose childhood series) Never done    Depression Screen  Never done    HIV screen  Never done    Hepatitis C screen  Never done    DTaP/Tdap/Td vaccine (1 - Tdap) Never done    COVID-19 Vaccine (4 - 2023-24 season) 09/01/2023    Flu vaccine (Season Ended) 08/01/2024    Hepatitis A vaccine  Aged Out    Hib vaccine  Aged Out    HPV vaccine  Aged Out    Polio vaccine  Aged Out    Meningococcal (ACWY) vaccine  Aged Out    Pneumococcal 0-64 years Vaccine  Aged Out       Subjective:     Review of Systems   Constitutional:  Negative for chills, fatigue and fever.   HENT:  Positive for sore throat. Negative for congestion, ear pain, rhinorrhea and sinus pressure.    Respiratory:  Negative for cough and wheezing.

## 2024-06-20 ENCOUNTER — HOSPITAL ENCOUNTER (EMERGENCY)
Age: 29
Discharge: HOME | End: 2024-06-20
Attending: EMERGENCY MEDICINE
Payer: COMMERCIAL

## 2024-06-20 VITALS
WEIGHT: 173 LBS | RESPIRATION RATE: 15 BRPM | HEART RATE: 65 BPM | TEMPERATURE: 97.1 F | BODY MASS INDEX: 24.77 KG/M2 | DIASTOLIC BLOOD PRESSURE: 63 MMHG | OXYGEN SATURATION: 99 % | HEIGHT: 70 IN | SYSTOLIC BLOOD PRESSURE: 131 MMHG

## 2024-06-20 DIAGNOSIS — M54.31 RIGHT SIDED SCIATICA: Primary | ICD-10-CM

## 2024-06-20 PROCEDURE — 96372 THER/PROPH/DIAG INJ SC/IM: CPT

## 2024-06-20 PROCEDURE — 6370000000 HC RX 637 (ALT 250 FOR IP): Performed by: EMERGENCY MEDICINE

## 2024-06-20 PROCEDURE — 6360000002 HC RX W HCPCS: Performed by: EMERGENCY MEDICINE

## 2024-06-20 PROCEDURE — 99284 EMERGENCY DEPT VISIT MOD MDM: CPT

## 2024-06-20 RX ORDER — DEXAMETHASONE SODIUM PHOSPHATE 10 MG/ML
10 INJECTION, SOLUTION INTRAMUSCULAR; INTRAVENOUS ONCE
Status: COMPLETED | OUTPATIENT
Start: 2024-06-20 | End: 2024-06-20

## 2024-06-20 RX ORDER — ONDANSETRON 4 MG/1
4 TABLET, ORALLY DISINTEGRATING ORAL ONCE
Status: COMPLETED | OUTPATIENT
Start: 2024-06-20 | End: 2024-06-20

## 2024-06-20 RX ORDER — OXYCODONE HYDROCHLORIDE AND ACETAMINOPHEN 5; 325 MG/1; MG/1
1 TABLET ORAL ONCE
Status: COMPLETED | OUTPATIENT
Start: 2024-06-20 | End: 2024-06-20

## 2024-06-20 RX ORDER — CYCLOBENZAPRINE HCL 10 MG
10 TABLET ORAL 3 TIMES DAILY PRN
Qty: 21 TABLET | Refills: 0 | Status: SHIPPED | OUTPATIENT
Start: 2024-06-20 | End: 2024-06-25

## 2024-06-20 RX ORDER — HYDROCODONE BITARTRATE AND ACETAMINOPHEN 5; 325 MG/1; MG/1
1 TABLET ORAL EVERY 6 HOURS PRN
Qty: 8 TABLET | Refills: 0 | Status: SHIPPED | OUTPATIENT
Start: 2024-06-20 | End: 2024-06-22

## 2024-06-20 RX ORDER — KETOROLAC TROMETHAMINE 30 MG/ML
30 INJECTION, SOLUTION INTRAMUSCULAR; INTRAVENOUS ONCE
Status: COMPLETED | OUTPATIENT
Start: 2024-06-20 | End: 2024-06-20

## 2024-06-20 RX ADMIN — DEXAMETHASONE SODIUM PHOSPHATE 10 MG: 10 INJECTION, SOLUTION INTRAMUSCULAR; INTRAVENOUS at 08:14

## 2024-06-20 RX ADMIN — KETOROLAC TROMETHAMINE 30 MG: 30 INJECTION, SOLUTION INTRAMUSCULAR at 08:14

## 2024-06-20 RX ADMIN — OXYCODONE HYDROCHLORIDE AND ACETAMINOPHEN 1 TABLET: 5; 325 TABLET ORAL at 08:14

## 2024-06-20 RX ADMIN — ONDANSETRON 4 MG: 4 TABLET, ORALLY DISINTEGRATING ORAL at 08:14

## 2024-06-20 ASSESSMENT — PAIN DESCRIPTION - DESCRIPTORS: DESCRIPTORS: PRESSURE

## 2024-06-20 ASSESSMENT — PAIN - FUNCTIONAL ASSESSMENT: PAIN_FUNCTIONAL_ASSESSMENT: 0-10

## 2024-06-20 ASSESSMENT — PAIN SCALES - GENERAL: PAINLEVEL_OUTOF10: 7

## 2024-06-20 ASSESSMENT — PAIN DESCRIPTION - LOCATION: LOCATION: BACK

## 2024-06-20 ASSESSMENT — PAIN DESCRIPTION - ORIENTATION: ORIENTATION: RIGHT

## 2024-06-20 NOTE — ED PROVIDER NOTES
pallor.   Neurologic:  Awake, alert, oriented to person, place, time, situation. Speech is clear. + SLR on the right.  LE strength 5/5 bilaterally. No sensory deficits to light touch bilateral lower extremities  Psychiatric: Normal interaction and behavior.  DIAGNOSTIC RESULTS     EKG: none    RADIOLOGY:        No orders to display           LABS:  Labs Reviewed - No data to display    All other labs were within normal range or not returned as of this dictation.    Prior records reviewed: No recent ED visits for similar complaints; MRI L spine 12/5/2017:  IMPRESSION:  1. Broad-based central left paracentral disc protrusion/extrusion at  L4-L5 with associated mass effect on the neural structures, described  above.  2. Right paracentral disc protrusion L5-S1.  Signed by Dr Binu Dowd on 12/5/2017 12:41 PM    EMERGENCY DEPARTMENT COURSE and DIFFERENTIAL DIAGNOSIS/MDM:   Vitals:    Vitals:    06/20/24 0726   BP: 131/63   Pulse: 65   Resp: 15   Temp: 97.1 °F (36.2 °C)   TempSrc: Oral   SpO2: 99%   Weight: 78.5 kg (173 lb)   Height: 1.778 m (5' 10\")       MDM    The pt is a 29 yo M who presents c/o sciatica. He has prior history of the same. He says years ago he had back pain and sciatica - had MRI and saw neurosurgeon - declined surgery because he is young and was treated with conservative measures. Last week at [facility] he was playing and fell on his knees. He says it wasn't any major fall. He had no symptoms until that night when he started to have pain along the right hip and lateral thigh. He says he has taken NSAIDs, ice and finally saw a chiropractor twice this week. He says he thinks it became  more inflammed an symptoms gradually worsened. He denies back pain, leg weakness, foot drop, tingling,saddle anesthesia, fever, abdominal pain. He has been ambulatory. There are no other complaints/concerns at this time.       Prior records reviewed by me as noted above. Shared decision making with pt and wife regarding  imaging offered but not felt emergently needed. Pt and wife are in complete agreement. Discussed treatment plan. Patient/wife agreeable.     The patient was resting comfortably on final recheck prior to disposition. Patient given verbal and written follow up and return instructions. I think he would benefit from short course of hydrocodone which he is to take in addition to 600mg ibuprofen TID. Rx flexeril as well. Advised caution when taking Norco and flexeril - no driving/operating machinery. Patient and wife verbalized understanding and agreement with the plan. All questions answered.       ED Course as of 06/22/24 1615   Thu Jun 20, 2024   0844 Recheck - feeling much better, ready to go home [JS]      ED Course User Index  [JS] Bea Bain DO         :  Unless otherwise noted below, none     Procedures    FINAL IMPRESSION      1. Right sided sciatica          DISPOSITION/PLAN   DISPOSITION     home    PATIENT REFERRED TO:  NIKA Becker DO  [address] [facility] [id number] [phone]    Schedule an appointment as soon as possible for a visit in 2 days  For a recheck    [facility] EMERGENCY DEPT  [facility] [facility] [address] [phone]    If symptoms worsen - especially with severe/uncontrolled/worsening pain, loss of bladder/bowel control, numbness around your \"sit region\", lower extremity weakness or with any worsening symptoms or concerns      DISCHARGE MEDICATIONS:  Discharge Medication List as of 6/20/2024  9:06 AM        START taking these medications    Details   HYDROcodone-acetaminophen (NORCO) 5-325 MG per tablet Take 1 tablet by mouth every 6 hours as needed for Pain for up to 2 days. Intended supply: 3 days. Take lowest dose possible to manage pain; USE WITH CAUTION THIS MED MAY MAKE DROWSY Max Daily Amount: 4 tablets, Disp-8 tablet, R-0Normal      cyclobenzaprine (FLEXERIL) 10 MG tablet Take 1 tablet by mouth 3 times daily as needed for Muscle spasms USE WITH CAUTION THIS MED

## 2024-06-26 ENCOUNTER — OFFICE VISIT (OUTPATIENT)
Dept: FAMILY MEDICINE CLINIC | Age: 29
End: 2024-06-26
Payer: COMMERCIAL

## 2024-06-26 VITALS
HEART RATE: 81 BPM | OXYGEN SATURATION: 99 % | SYSTOLIC BLOOD PRESSURE: 132 MMHG | TEMPERATURE: 98.5 F | DIASTOLIC BLOOD PRESSURE: 76 MMHG | BODY MASS INDEX: 24.68 KG/M2 | WEIGHT: 172 LBS

## 2024-06-26 DIAGNOSIS — Z09 HOSPITAL DISCHARGE FOLLOW-UP: ICD-10-CM

## 2024-06-26 DIAGNOSIS — M54.31 RIGHT SIDED SCIATICA: Primary | ICD-10-CM

## 2024-06-26 PROCEDURE — 1111F DSCHRG MED/CURRENT MED MERGE: CPT | Performed by: NURSE PRACTITIONER

## 2024-06-26 PROCEDURE — 99213 OFFICE O/P EST LOW 20 MIN: CPT | Performed by: NURSE PRACTITIONER

## 2024-06-26 SDOH — ECONOMIC STABILITY: FOOD INSECURITY: WITHIN THE PAST 12 MONTHS, YOU WORRIED THAT YOUR FOOD WOULD RUN OUT BEFORE YOU GOT MONEY TO BUY MORE.: NEVER TRUE

## 2024-06-26 SDOH — ECONOMIC STABILITY: HOUSING INSECURITY
IN THE LAST 12 MONTHS, WAS THERE A TIME WHEN YOU DID NOT HAVE A STEADY PLACE TO SLEEP OR SLEPT IN A SHELTER (INCLUDING NOW)?: NO

## 2024-06-26 SDOH — ECONOMIC STABILITY: FOOD INSECURITY: WITHIN THE PAST 12 MONTHS, THE FOOD YOU BOUGHT JUST DIDN'T LAST AND YOU DIDN'T HAVE MONEY TO GET MORE.: NEVER TRUE

## 2024-06-26 SDOH — ECONOMIC STABILITY: INCOME INSECURITY: HOW HARD IS IT FOR YOU TO PAY FOR THE VERY BASICS LIKE FOOD, HOUSING, MEDICAL CARE, AND HEATING?: NOT HARD AT ALL

## 2024-06-26 SDOH — ECONOMIC STABILITY: TRANSPORTATION INSECURITY
IN THE PAST 12 MONTHS, HAS LACK OF TRANSPORTATION KEPT YOU FROM MEETINGS, WORK, OR FROM GETTING THINGS NEEDED FOR DAILY LIVING?: NO

## 2024-06-26 ASSESSMENT — ENCOUNTER SYMPTOMS
BACK PAIN: 1
RHINORRHEA: 0
NAUSEA: 0
SHORTNESS OF BREATH: 0
CONSTIPATION: 0
ABDOMINAL PAIN: 0
COUGH: 0
SORE THROAT: 0
TROUBLE SWALLOWING: 0
DIARRHEA: 0
VOMITING: 0

## 2024-06-26 ASSESSMENT — PATIENT HEALTH QUESTIONNAIRE - PHQ9
SUM OF ALL RESPONSES TO PHQ QUESTIONS 1-9: 0
SUM OF ALL RESPONSES TO PHQ QUESTIONS 1-9: 0
1. LITTLE INTEREST OR PLEASURE IN DOING THINGS: NOT AT ALL
SUM OF ALL RESPONSES TO PHQ9 QUESTIONS 1 & 2: 0
2. FEELING DOWN, DEPRESSED OR HOPELESS: NOT AT ALL
SUM OF ALL RESPONSES TO PHQ QUESTIONS 1-9: 0
SUM OF ALL RESPONSES TO PHQ QUESTIONS 1-9: 0

## 2024-06-26 NOTE — PROGRESS NOTES
Apollo Garvey (:  1995) is a 29 y.o. male,Established patient, here for evaluation of the following chief complaint(s):  Follow-up (Pain down right leg. Was seen in [facility] er. Was given two shots, mus relx and two days of pain meds. Medications did help. )      ASSESSMENT/PLAN:    ICD-10-CM    1. Right sided sciatica  M54.31 Take steroids as prescribed. Avoid taking NSAIDs while taking steroids. Ice and heat.   If not improving follow up.       2. Hospital discharge follow-up  Z09 MO DISCHARGE MEDS RECONCILED W/ CURRENT OUTPATIENT MED LIST          Return if symptoms worsen or fail to improve.    SUBJECTIVE/OBJECTIVE:  HPI  Here for follow up after ER visit for lower back pain  Seen 2024 at [facility] ER with complaints of lower back pain radiating down right leg.   He has hx of sciatic and years ago had MRI and saw neurosurgery.   Patient reports he was at [employer] camp playing a game, slipped and fell on his knees. Didn't hurt at that time. Later that night started having pain.   He tried ice, NSAIDS and saw chiropractor before going to ER.   He was given dexadron 10mg IM, toradol, zofran and oxcodone in the ER.   ER discharged with hydrocodone, ibuprofen and flexeril.   Today he reports pain is better but still hurts. Pain is mainly when straightening out leg.   [facility] to [facility] days ago and they gave steroids. Started it yesterday. They did xray and said alignment was good.   They referred to physical therapy starting next week.     MRI Lumbar Spine 2017:  IMPRESSION:  1. Broad-based central left paracentral disc protrusion/extrusion at  L4-L5 with associated mass effect on the neural structures, described  above.  2. Right paracentral disc protrusion L5-S1.    /76 (Site: Left Upper Arm, Position: Sitting, Cuff Size: Medium Adult)   Pulse 81   Temp 98.5 °F (36.9 °C) (Temporal)   Wt 78 kg (172 lb)   SpO2 99%   BMI 24.68 kg/m²     Review of Systems   Constitutional:  Negative for

## 2024-07-01 ENCOUNTER — NURSE ONLY (OUTPATIENT)
Dept: FAMILY MEDICINE CLINIC | Age: 29
End: 2024-07-01

## 2024-07-01 DIAGNOSIS — Z13.9 SCREENING DUE: Primary | ICD-10-CM

## 2024-07-01 SDOH — ECONOMIC STABILITY: FOOD INSECURITY: WITHIN THE PAST 12 MONTHS, THE FOOD YOU BOUGHT JUST DIDN'T LAST AND YOU DIDN'T HAVE MONEY TO GET MORE.: NEVER TRUE

## 2024-07-01 SDOH — ECONOMIC STABILITY: FOOD INSECURITY: WITHIN THE PAST 12 MONTHS, YOU WORRIED THAT YOUR FOOD WOULD RUN OUT BEFORE YOU GOT MONEY TO BUY MORE.: NEVER TRUE

## 2024-07-01 SDOH — ECONOMIC STABILITY: INCOME INSECURITY: HOW HARD IS IT FOR YOU TO PAY FOR THE VERY BASICS LIKE FOOD, HOUSING, MEDICAL CARE, AND HEATING?: NOT HARD AT ALL

## 2024-07-01 NOTE — PROGRESS NOTES
Question 6/26/2024  6:50 AM CDT - Filed by Patient   How hard is it for you to pay for the very basics like food, housing, medical care, and heating? Not hard at all   Within the past 12 months, you worried that your food would run out before you got the money to buy more. Never true   Within the past 12 months, the food you bought just didn’t last and you didn’t have money to get more. Never true   In the past 12 months, has lack of transportation kept you from meetings, work, or from getting things needed for daily living? No   In the last 12 months, was there a time when you did not have a steady place to sleep or slept in a shelter (including now)? No   Would you like resources for any of these topics? No, thank yo

## 2025-07-14 ENCOUNTER — OFFICE VISIT (OUTPATIENT)
Age: 30
End: 2025-07-14

## 2025-07-14 VITALS
WEIGHT: 174 LBS | SYSTOLIC BLOOD PRESSURE: 142 MMHG | HEART RATE: 66 BPM | OXYGEN SATURATION: 98 % | TEMPERATURE: 98.8 F | DIASTOLIC BLOOD PRESSURE: 80 MMHG | BODY MASS INDEX: 24.97 KG/M2

## 2025-07-14 DIAGNOSIS — R39.15 URINARY URGENCY: ICD-10-CM

## 2025-07-14 DIAGNOSIS — R03.0 ELEVATED BLOOD PRESSURE READING: Primary | ICD-10-CM

## 2025-07-14 DIAGNOSIS — R35.0 URINARY FREQUENCY: ICD-10-CM

## 2025-07-14 LAB
CHP ED QC CHECK: NORMAL
GLUCOSE BLD-MCNC: 90 MG/DL

## 2025-07-14 RX ORDER — CEFDINIR 300 MG/1
300 CAPSULE ORAL 2 TIMES DAILY
Qty: 20 CAPSULE | Refills: 0 | Status: SHIPPED | OUTPATIENT
Start: 2025-07-14 | End: 2025-07-24

## 2025-07-14 ASSESSMENT — ENCOUNTER SYMPTOMS
SINUS PRESSURE: 0
CHEST TIGHTNESS: 0
ABDOMINAL DISTENTION: 0
COUGH: 0
STRIDOR: 0
COLOR CHANGE: 0
EYE PAIN: 0
SORE THROAT: 0
SHORTNESS OF BREATH: 0
ABDOMINAL PAIN: 0
EYE DISCHARGE: 0
WHEEZING: 0
TROUBLE SWALLOWING: 0

## 2025-07-14 NOTE — PATIENT INSTRUCTIONS
Unable to run UA due to taking AZO  Blood glucose:90  Cefdinir sent  Urine culture pending  Push fluids  Follow-up with Pcp as needed  Go to ER for worrisome symptoms    Patient verbalized understanding and agrees to plan.

## 2025-07-14 NOTE — PROGRESS NOTES
flat. Bowel sounds are normal. There is no distension.      Tenderness: There is no abdominal tenderness. There is no right CVA tenderness or left CVA tenderness.   Musculoskeletal:         General: No swelling or deformity. Normal range of motion.      Cervical back: Normal range of motion. No rigidity or tenderness.   Skin:     General: Skin is warm and dry.      Findings: No rash.   Neurological:      General: No focal deficit present.      Mental Status: He is alert and oriented to person, place, and time.      Sensory: No sensory deficit.         BP (!) 142/80   Pulse 66   Temp 98.8 °F (37.1 °C)   Wt 78.9 kg (174 lb)   SpO2 98%   BMI 24.97 kg/m²     Assessment   Assessment & Plan      Diagnosis Orders   1. Elevated blood pressure reading        2. Urinary urgency  Culture, Urine    cefdinir (OMNICEF) 300 MG capsule    POCT Glucose      3. Urinary frequency            Plan   Unable to run UA due to taking AZO  Blood glucose:90  Cefdinir sent  Urine culture pending  Push fluids  Follow-up with Pcp as needed  Go to ER for worrisome symptoms    Patient verbalized understanding and agrees to plan.   Orders Placed This Encounter   Procedures    Culture, Urine    POCT Glucose       No results found for this visit on 07/14/25.    Orders Placed This Encounter   Medications    cefdinir (OMNICEF) 300 MG capsule     Sig: Take 1 capsule by mouth 2 times daily for 10 days     Dispense:  20 capsule     Refill:  0      New Prescriptions    CEFDINIR (OMNICEF) 300 MG CAPSULE    Take 1 capsule by mouth 2 times daily for 10 days        No follow-ups on file.         Discussed use, benefits, and side effects of any prescribed medications. All patient questions were answered. Patient voiced understanding of care plan.   Patient was given educational materials - see patient instructions below.     Patient Instructions   Unable to run UA due to taking AZO  Blood glucose:90  Cefdinir sent  Urine culture pending  Push

## 2025-07-17 LAB — BACTERIA UR CULT: NORMAL
